# Patient Record
Sex: FEMALE | Race: WHITE | Employment: FULL TIME | URBAN - NONMETROPOLITAN AREA
[De-identification: names, ages, dates, MRNs, and addresses within clinical notes are randomized per-mention and may not be internally consistent; named-entity substitution may affect disease eponyms.]

---

## 2020-03-18 ENCOUNTER — HOSPITAL ENCOUNTER (OUTPATIENT)
Age: 50
Discharge: HOME | End: 2020-03-18
Payer: COMMERCIAL

## 2020-03-18 LAB
ABSOLUTE EOS #: 0.47 K/UL (ref 0–0.44)
ABSOLUTE IMMATURE GRANULOCYTE: <0.03 K/UL (ref 0–0.3)
ABSOLUTE LYMPH #: 2.3 K/UL (ref 1.1–3.7)
ABSOLUTE MONO #: 0.52 K/UL (ref 0.1–1.2)
ALBUMIN SERPL-MCNC: 3.9 G/DL (ref 3.5–5.2)
ALBUMIN/GLOBULIN RATIO: 1.3 (ref 1–2.5)
ALP BLD-CCNC: 74 U/L (ref 35–104)
ALT SERPL-CCNC: 9 U/L (ref 5–33)
ANION GAP SERPL CALCULATED.3IONS-SCNC: 11 MMOL/L (ref 9–17)
AST SERPL-CCNC: 10 U/L
BASOPHILS # BLD: 1 % (ref 0–2)
BASOPHILS ABSOLUTE: 0.07 K/UL (ref 0–0.2)
BILIRUB SERPL-MCNC: 0.2 MG/DL (ref 0.3–1.2)
BUN BLDV-MCNC: 12 MG/DL (ref 6–20)
BUN/CREAT BLD: 20 (ref 9–20)
CALCIUM SERPL-MCNC: 9 MG/DL (ref 8.6–10.4)
CHLORIDE BLD-SCNC: 99 MMOL/L (ref 98–107)
CHOLESTEROL/HDL RATIO: 4.2
CHOLESTEROL: 230 MG/DL
CO2: 25 MMOL/L (ref 20–31)
CREAT SERPL-MCNC: 0.59 MG/DL (ref 0.5–0.9)
DIFFERENTIAL TYPE: ABNORMAL
EOSINOPHILS RELATIVE PERCENT: 7 % (ref 1–4)
GFR AFRICAN AMERICAN: >60 ML/MIN
GFR NON-AFRICAN AMERICAN: >60 ML/MIN
GFR SERPL CREATININE-BSD FRML MDRD: ABNORMAL ML/MIN/{1.73_M2}
GFR SERPL CREATININE-BSD FRML MDRD: ABNORMAL ML/MIN/{1.73_M2}
GLUCOSE BLD-MCNC: 86 MG/DL (ref 70–99)
HCT VFR BLD CALC: 41.1 % (ref 36.3–47.1)
HDLC SERPL-MCNC: 55 MG/DL
HEMOGLOBIN: 13.1 G/DL (ref 11.9–15.1)
HEPATITIS C ANTIBODY: NONREACTIVE
HIV AG/AB: NONREACTIVE
IMMATURE GRANULOCYTES: 0 %
LDL CHOLESTEROL: 141 MG/DL (ref 0–130)
LYMPHOCYTES # BLD: 32 % (ref 24–43)
MCH RBC QN AUTO: 29.1 PG (ref 25.2–33.5)
MCHC RBC AUTO-ENTMCNC: 31.9 G/DL (ref 28.4–34.8)
MCV RBC AUTO: 91.3 FL (ref 82.6–102.9)
MONOCYTES # BLD: 7 % (ref 3–12)
NRBC AUTOMATED: 0 PER 100 WBC
PDW BLD-RTO: 13.3 % (ref 11.8–14.4)
PLATELET # BLD: 374 K/UL (ref 138–453)
PLATELET ESTIMATE: ABNORMAL
PMV BLD AUTO: 10.3 FL (ref 8.1–13.5)
POTASSIUM SERPL-SCNC: 4 MMOL/L (ref 3.7–5.3)
RBC # BLD: 4.5 M/UL (ref 3.95–5.11)
RBC # BLD: ABNORMAL 10*6/UL
SEG NEUTROPHILS: 53 % (ref 36–65)
SEGMENTED NEUTROPHILS ABSOLUTE COUNT: 3.89 K/UL (ref 1.5–8.1)
SODIUM BLD-SCNC: 135 MMOL/L (ref 135–144)
TOTAL PROTEIN: 7 G/DL (ref 6.4–8.3)
TRIGL SERPL-MCNC: 170 MG/DL
TSH SERPL DL<=0.05 MIU/L-ACNC: 3.96 MIU/L (ref 0.3–5)
VLDLC SERPL CALC-MCNC: ABNORMAL MG/DL (ref 1–30)
WBC # BLD: 7.3 K/UL (ref 3.5–11.3)
WBC # BLD: ABNORMAL 10*3/UL

## 2020-03-18 PROCEDURE — 80053 COMPREHEN METABOLIC PANEL: CPT

## 2020-03-18 PROCEDURE — 85025 COMPLETE CBC W/AUTO DIFF WBC: CPT

## 2020-03-18 PROCEDURE — 84443 ASSAY THYROID STIM HORMONE: CPT

## 2020-03-18 PROCEDURE — 87389 HIV-1 AG W/HIV-1&-2 AB AG IA: CPT

## 2020-03-18 PROCEDURE — 80061 LIPID PANEL: CPT

## 2020-03-18 PROCEDURE — 86707 HEPATITIS BE ANTIBODY: CPT

## 2020-03-18 PROCEDURE — 36415 COLL VENOUS BLD VENIPUNCTURE: CPT

## 2020-03-18 PROCEDURE — 86803 HEPATITIS C AB TEST: CPT

## 2020-03-20 LAB — HEPATITIS BE ANTIBODY: NEGATIVE

## 2021-03-29 ENCOUNTER — HOSPITAL ENCOUNTER (OUTPATIENT)
Age: 51
Setting detail: SPECIMEN
Discharge: HOME | End: 2021-03-29
Payer: COMMERCIAL

## 2021-03-30 LAB
CHLAMYDIA BY THIN PREP: NEGATIVE
N. GONORRHOEAE DNA, THIN PREP: NEGATIVE
SPECIMEN DESCRIPTION: NORMAL

## 2021-03-31 LAB
HPV SAMPLE: NORMAL
HPV, GENOTYPE 16: NOT DETECTED
HPV, GENOTYPE 18: NOT DETECTED
HPV, HIGH RISK OTHER: NOT DETECTED
HPV, INTERPRETATION: NORMAL
SPECIMEN DESCRIPTION: NORMAL

## 2021-04-02 LAB — CYTOLOGY REPORT: NORMAL

## 2021-04-06 ENCOUNTER — OFFICE VISIT (OUTPATIENT)
Dept: PRIMARY CARE CLINIC | Age: 51
End: 2021-04-06
Payer: COMMERCIAL

## 2021-04-06 VITALS
TEMPERATURE: 98.7 F | RESPIRATION RATE: 18 BRPM | BODY MASS INDEX: 24.06 KG/M2 | SYSTOLIC BLOOD PRESSURE: 142 MMHG | HEART RATE: 90 BPM | HEIGHT: 63 IN | DIASTOLIC BLOOD PRESSURE: 87 MMHG | WEIGHT: 135.8 LBS | OXYGEN SATURATION: 99 %

## 2021-04-06 DIAGNOSIS — J01.40 ACUTE NON-RECURRENT PANSINUSITIS: Primary | ICD-10-CM

## 2021-04-06 DIAGNOSIS — B37.9 ANTIBIOTIC-INDUCED YEAST INFECTION: ICD-10-CM

## 2021-04-06 DIAGNOSIS — T36.95XA ANTIBIOTIC-INDUCED YEAST INFECTION: ICD-10-CM

## 2021-04-06 PROBLEM — E78.5 HYPERLIPIDEMIA: Status: ACTIVE | Noted: 2021-04-06

## 2021-04-06 PROBLEM — D49.2 NEOPLASM OF SKIN: Status: ACTIVE | Noted: 2020-06-11

## 2021-04-06 PROBLEM — Z13.1 ENCOUNTER FOR SCREENING FOR DIABETES MELLITUS: Status: ACTIVE | Noted: 2020-03-16

## 2021-04-06 PROBLEM — R21 SKIN ERUPTION: Status: ACTIVE | Noted: 2020-06-11

## 2021-04-06 PROBLEM — J32.9 SINUSITIS: Status: ACTIVE | Noted: 2020-04-20

## 2021-04-06 PROBLEM — G47.00 INSOMNIA: Status: ACTIVE | Noted: 2021-04-06

## 2021-04-06 PROCEDURE — 1036F TOBACCO NON-USER: CPT | Performed by: NURSE PRACTITIONER

## 2021-04-06 PROCEDURE — G8427 DOCREV CUR MEDS BY ELIG CLIN: HCPCS | Performed by: NURSE PRACTITIONER

## 2021-04-06 PROCEDURE — 99202 OFFICE O/P NEW SF 15 MIN: CPT | Performed by: NURSE PRACTITIONER

## 2021-04-06 PROCEDURE — G8420 CALC BMI NORM PARAMETERS: HCPCS | Performed by: NURSE PRACTITIONER

## 2021-04-06 PROCEDURE — 3017F COLORECTAL CA SCREEN DOC REV: CPT | Performed by: NURSE PRACTITIONER

## 2021-04-06 RX ORDER — PREDNISONE 20 MG/1
TABLET ORAL
Qty: 10 TABLET | Refills: 0 | Status: SHIPPED | OUTPATIENT
Start: 2021-04-06 | End: 2021-06-10

## 2021-04-06 RX ORDER — ETONOGESTREL AND ETHINYL ESTRADIOL 11.7; 2.7 MG/1; MG/1
INSERT, EXTENDED RELEASE VAGINAL
COMMUNITY
Start: 2021-03-29 | End: 2021-06-14

## 2021-04-06 RX ORDER — FLUCONAZOLE 150 MG/1
TABLET ORAL
Qty: 2 TABLET | Refills: 0 | Status: SHIPPED | OUTPATIENT
Start: 2021-04-06 | End: 2021-05-21

## 2021-04-06 RX ORDER — ESOMEPRAZOLE MAGNESIUM 40 MG/1
40 CAPSULE, DELAYED RELEASE ORAL
COMMUNITY
End: 2021-12-23

## 2021-04-06 RX ORDER — FLUTICASONE PROPIONATE 50 MCG
2 SPRAY, SUSPENSION (ML) NASAL DAILY
COMMUNITY

## 2021-04-06 RX ORDER — AMOXICILLIN AND CLAVULANATE POTASSIUM 875; 125 MG/1; MG/1
1 TABLET, FILM COATED ORAL 2 TIMES DAILY
Qty: 20 TABLET | Refills: 0 | Status: SHIPPED | OUTPATIENT
Start: 2021-04-06 | End: 2021-04-16

## 2021-04-06 ASSESSMENT — ENCOUNTER SYMPTOMS
COUGH: 1
SINUS PRESSURE: 1
NAUSEA: 0
SORE THROAT: 1
SINUS COMPLAINT: 1
DIARRHEA: 0
SHORTNESS OF BREATH: 0
VOMITING: 0
RHINORRHEA: 1
WHEEZING: 0
SINUS PAIN: 1

## 2021-04-06 NOTE — PATIENT INSTRUCTIONS
a hot, wet towel or a warm gel pack on your face 3 or 4 times a day for 5 to 10 minutes each time. · Try a decongestant nasal spray like oxymetazoline (Afrin). Do not use it for more than 3 days in a row. Using it for more than 3 days can make your congestion worse. When should you call for help? Call your doctor now or seek immediate medical care if:    · You have new or worse swelling or redness in your face or around your eyes.     · You have a new or higher fever. Watch closely for changes in your health, and be sure to contact your doctor if:    · You have new or worse facial pain.     · The mucus from your nose becomes thicker (like pus) or has new blood in it.     · You are not getting better as expected. Where can you learn more? Go to [email or web address] and sign in to your [employer] account. Enter L049 in the [place] box to learn more about \"Sinusitis: Care Instructions. \"     If you do not have an account, please click on the \"Sign Up Now\" link. Current as of: December 2, 2020               Content Version: 12.8  © 5138-0338 [employer]. Care instructions adapted under license by [facility] ([facility]). If you have questions about a medical condition or this instruction, always ask your healthcare professional. [address] any warranty or liability for your use of this information. Patient Education      Patient Education        amoxicillin and clavulanate potassium  Pronunciation:  am OK i LEMUEL in [address]  Brand:  Augmentin  What is the most important information I should know about amoxicillin and clavulanate potassium?   You should not use this medicine if you have severe kidney disease, if you have had liver problems or jaundice while taking amoxicillin and clavulanate potassium, or if you are allergic to any penicillin or cephalosporin antibiotic, such as Amoxil, Ceftin, Cefzil, Moxatag, Omnicef, and others. What is amoxicillin and clavulanate potassium? Amoxicillin is a penicillin antibiotic. Clavulanate potassium helps prevent certain bacteria from becoming resistant to amoxicillin. Amoxicillin and clavulanate potassium is a combination medicine used to treat many different infections caused by bacteria, such as sinusitis, pneumonia, ear infections, bronchitis, urinary tract infections, and infections of the skin. Amoxicillin and clavulanate potassium may also be used for purposes not listed in this medication guide. What should I discuss with my healthcare provider before taking amoxicillin and clavulanate potassium? You should not use this medicine if you are allergic to it, or if:  · you have severe kidney disease (or if you are on dialysis);  · you have had liver problems or jaundice while taking amoxicillin and clavulanate potassium; or  · you are allergic to any penicillin or cephalosporin antibiotic, such as Amoxil, Ceftin, Cefzil, Moxatag, Omnicef, and others. Tell your doctor if you have ever had:  · liver disease (hepatitis or jaundice);  · kidney disease; or  · mononucleosis. The liquid or chewable tablet may contain phenylalanine. Tell your doctor if you have phenylketonuria (PKU). Tell your doctor if you are pregnant or breastfeeding. Amoxicillin and clavulanate potassium can make birth control pills less effective. Ask your doctor about using a non-hormonal birth control (condom, diaphragm, cervical cap, or contraceptive sponge) to prevent pregnancy. Do not give this medicine to a child without medical advice. How should I take amoxicillin and clavulanate potassium? Follow all directions on your prescription label and read all medication guides or instruction sheets. Use the medicine exactly as directed. Amoxicillin and clavulanate potassium may work best if you take it at the start of a meal.  Take the medicine every 12 hours. Do not crush or chew the extended-release tablet. Swallow the pill whole, or break the pill in half and take both halves one at a time. Tell your doctor if you have trouble swallowing a whole or half pill. You must chew the chewable tablet before you swallow it. Shake the oral suspension (liquid) before you measure a dose. Use the dosing syringe provided, or use a medicine dose-measuring device (not a kitchen spoon). This medicine can affect the results of certain medical tests. Tell any doctor who treats you that you are using amoxicillin and clavulanate potassium. Use this medicine for the full prescribed length of time, even if your symptoms quickly improve. Skipping doses can increase your risk of infection that is resistant to medication. Amoxicillin and clavulanate potassium will not treat a viral infection such as the flu or a common cold. Store the tablets at room temperature away from moisture and heat. Store the liquid  in the refrigerator. Throw away any unused liquid after 10 days. What happens if I miss a dose? Take the medicine as soon as you can, but skip the missed dose if it is almost time for your next dose. Do not take two doses at one time. What happens if I overdose? Seek emergency medical attention or call the Poison Help line at 1-932.971.6342. Overdose can cause nausea, vomiting, stomach pain, diarrhea, skin rash, drowsiness, hyperactivity, and decreased urination. What should I avoid while taking amoxicillin and clavulanate potassium? Avoid taking this medicine together with or just after eating a high-fat meal. This will make it harder for your body to absorb the medication. Antibiotic medicines can cause diarrhea, which may be a sign of a new infection. If you have diarrhea that is watery or bloody, call your doctor before using anti-diarrhea medicine. What are the possible side effects of amoxicillin and clavulanate potassium?   Get emergency medical help if you have signs of an allergic reaction (hives, difficult breathing, swelling in your face or throat) or a severe skin reaction (fever, sore throat, burning eyes, skin pain, red or purple skin rash with blistering and peeling). Call your doctor at once if you have:  · severe stomach pain, diarrhea that is watery or bloody (even if it occurs months after your last dose);  · pale or yellowed skin, dark colored urine, fever, confusion or weakness;  · loss of appetite, upper stomach pain;  · little or no urination; or  · easy bruising or bleeding. Common side effects may include:  · nausea, vomiting; diarrhea;  · rash, itching;  · vaginal itching or discharge; or  · diaper rash. This is not a complete list of side effects and others may occur. Call your doctor for medical advice about side effects. You may report side effects to FDA at 5-047-FDA-4065. What other drugs will affect amoxicillin and clavulanate potassium? Tell your doctor about all your other medicines, especially:  · allopurinol;  · probenecid; or  · a blood thinner --warfarin, Coumadin, Jantoven. This list is not complete. Other drugs may affect amoxicillin and clavulanate potassium, including prescription and over-the-counter medicines, vitamins, and herbal products. Not all possible drug interactions are listed here. Where can I get more information? Your pharmacist can provide more information about amoxicillin and clavulanate potassium. Remember, keep this and all other medicines out of the reach of children, never share your medicines with others, and use this medication only for the indication prescribed. Every effort has been made to ensure that the information provided by [address] is accurate, up-to-date, and complete, but no guarantee is made to that effect. Drug information contained herein may be time sensitive.  [employer] information has been compiled for use by healthcare practitioners and consumers in the [place] and therefore [employer] does not warrant that uses outside of the [place] are appropriate, unless specifically indicated otherwise. [facility] drug information does not endorse drugs, diagnose patients or recommend therapy. [employer] drug information is an informational resource designed to assist licensed healthcare practitioners in caring for their patients and/or to serve consumers viewing this service as a supplement to, and not a substitute for, the expertise, skill, knowledge and judgment of healthcare practitioners. The absence of a warning for a given drug or drug combination in no way should be construed to indicate that the drug or drug combination is safe, effective or appropriate for any given patient. [facility] does not assume any responsibility for any aspect of healthcare administered with the aid of information [facility] provides. The information contained herein is not intended to cover all possible uses, directions, precautions, warnings, drug interactions, allergic reactions, or adverse effects. If you have questions about the drugs you are taking, check with your doctor, nurse or pharmacist.  Copyright 9890-6861 [address]: 12.01. Revision date: 2/24/2020. Care instructions adapted under license by [facility] [place] ([facility]). If you have questions about a medical condition or this instruction, always ask your healthcare professional. [place] [address] any warranty or liability for your use of this information. Vaginal Yeast Infection: Care Instructions  Overview     A vaginal yeast infection is the growth of too many yeast cells in the vagina. This is common in women of all ages. Itching, vaginal discharge and irritation, and other symptoms can bother you. But yeast infections don't often cause other health problems. Some medicines can increase your risk of getting a yeast infection. These include antibiotics, birth control pills, hormones, and steroids.  You may also be more likely to get a yeast infection if you are [email or web address] and sign in to your [employer] account. Enter R795 in the [facility] box to learn more about \"Vaginal Yeast Infection: Care Instructions. \"     If you do not have an account, please click on the \"Sign Up Now\" link. Current as of: July 17, 2020               Content Version: 12.8  © 2006-2021 [employer]. Care instructions adapted under license by [facility] ([facility]). If you have questions about a medical condition or this instruction, always ask your healthcare professional. [place] [address] any warranty or liability for your use of this information. Patient Education        fluconazole (oral/injection)  Pronunciation:  floo KOE na zole  Brand:  Diflucan  What is the most important information I should know about fluconazole? Tell your doctor about all your current medicines and any you start or stop using. Many drugs can interact, and some drugs should not be used together. What is fluconazole? Fluconazole is an antifungal medicine. Fluconazole is used to treat infections caused by fungus, which can invade any part of the body including the mouth, throat, esophagus, lungs, bladder, genital area, and the blood. Fluconazole is also used to prevent fungal infection in people who have a weak immune system caused by cancer treatment, bone marrow transplant, or diseases such as AIDS. Fluconazole is also used to treat a certain type of meningitis in people with HIV or AIDS. Fluconazole may also be used for purposes not listed in this medication guide. What should I discuss with my healthcare provider before taking fluconazole? You should not use fluconazole if you are allergic to it. Some medicines can cause unwanted or dangerous effects when used with fluconazole.  Your doctor may change your treatment plan if you also use:  · an antibiotic, antifungal, or antiviral medicine;  · a blood thinner;  · cancer medicine;  · cholesterol medication;  · oral diabetes medicine;  · heart or blood pressure medication;  · medicine for malaria or tuberculosis;  · medicine to prevent organ transplant rejection;  · medicine to treat depression or mental illness;  · an NSAID (nonsteroidal anti-inflammatory drug);  · seizure medicine; or  · steroid medicine. Tell your doctor if you have ever had:  · liver disease;  · HIV or AIDS;  · cancer;  · heart problems;  · long QT syndrome (in you or a family member);  · kidney disease; or  · if you are allergic to other antifungal medicine (such as ketoconazole, itraconazole, miconazole, posaconazole, voriconazole, and others). The liquid form of fluconazole contains sucrose. Talk to your doctor before using this form of fluconazole if you have a problem digesting sugars or milk. It is not known whether this medicine will harm an unborn baby. Tell your doctor if you are pregnant or plan to become pregnant. It may not be safe to breastfeed while using this medicine. Ask your doctor about any risk. How should I take fluconazole? Follow all directions on your prescription label and read all medication guides or instruction sheets. Use the medicine exactly as directed. Your dose will depend on the infection you are treating. Vaginal infections are often treated with only one pill. For other infections, your first dose may be a double dose. Carefully follow your doctor's instructions. Fluconazole oral  is taken by mouth. Fluconazole injection is given as an infusion into a vein. You may take fluconazole with or without food. Shake the oral suspension (liquid) before you measure a dose. Use the dosing syringe provided, or use a medicine dose-measuring device (not a kitchen spoon). Fluconazole injection is given as an infusion into a vein. A healthcare provider will give your first dose and may teach you how to properly use the medication by yourself. Prepare an injection only when you are ready to give it.  Do not use if the medicine looks cloudy, has changed colors, or has particles in it. Call your pharmacist for new medicine. Use this medicine for the full prescribed length of time, even if your symptoms quickly improve. Skipping doses can increase your risk of infection that is resistant to medication. Fluconazole will not treat a viral infection such as the flu or a common cold. Call your doctor if your symptoms do not improve, or if they get worse. Store fluconazole at room temperature away from moisture and heat. Do not freeze. You may store the oral suspension in a refrigerator, but do not allow it to freeze. Throw away any leftover liquid that is more than 3weeks old. What happens if I miss a dose? Use the medicine as soon as you can, but skip the missed dose if it is almost time for your next dose. Do not use two doses at one time. What happens if I overdose? Seek emergency medical attention or call the Poison Help line at 1-777.687.9719. Overdose symptoms may include confusion or unusual thoughts or behavior. What should I avoid while using fluconazole? Follow your doctor's instructions about any restrictions on food, beverages, or activity. Avoid driving or hazardous activity until you know how this medicine will affect you. Your reactions could be impaired. What are the possible side effects of fluconazole? Get emergency medical help if you have signs of an allergic reaction (hives, difficult breathing, swelling in your face or throat) or a severe skin reaction (fever, sore throat, burning eyes, skin pain, red or purple skin rash with blistering and peeling).   Call your doctor at once if you have:  · fast or pounding heartbeats, fluttering in your chest, shortness of breath, and sudden dizziness (like you might pass out);  · fever, chills, body aches, flu symptoms;  · easy bruising or bleeding, unusual weakness;  · seizure (convulsions);  · skin rash or skin lesions; or  · liver problems --loss of appetite, stomach pain (upper right side), dark urine, ya-colored stools, jaundice (yellowing of the skin or eyes). Common side effects may include:  · nausea, stomach pain, diarrhea, upset stomach;  · headache;  · dizziness; or  · changes in your sense of taste. This is not a complete list of side effects and others may occur. Call your doctor for medical advice about side effects. You may report side effects to FDA at 3-487-VLN-7751. What other drugs will affect fluconazole? Sometimes it is not safe to use certain medications at the same time. Some drugs can affect your blood levels of other drugs you take, which may increase side effects or make the medications less effective. Fluconazole can cause a serious heart problem. Your risk may be higher if you also use certain other medicines for infections, asthma, heart problems, high blood pressure, depression, mental illness, cancer, malaria, or HIV. Many drugs can affect fluconazole, and some drugs should not be used at the same time. Tell your doctor about all your current medicines and any medicine you start or stop using. This includes prescription and over-the-counter medicines, vitamins, and herbal products. Not all possible interactions are listed here. Where can I get more information? Your pharmacist can provide more information about fluconazole. Remember, keep this and all other medicines out of the reach of children, never share your medicines with others, and use this medication only for the indication prescribed. Every effort has been made to ensure that the information provided by [address] Dr is accurate, up-to-date, and complete, but no guarantee is made to that effect. Drug information contained herein may be time sensitive.  [employer] information has been compiled for use by healthcare practitioners and consumers in the [place] and therefore [employer] does not warrant that uses outside of the [place] are appropriate, unless specifically indicated otherwise. [facility] drug information does not endorse drugs, diagnose patients or recommend therapy. [facility] drug information is an informational resource designed to assist licensed healthcare practitioners in caring for their patients and/or to serve consumers viewing this service as a supplement to, and not a substitute for, the expertise, skill, knowledge and judgment of healthcare practitioners. The absence of a warning for a given drug or drug combination in no way should be construed to indicate that the drug or drug combination is safe, effective or appropriate for any given patient. [facility] does not assume any responsibility for any aspect of healthcare administered with the aid of information [facility] provides. The information contained herein is not intended to cover all possible uses, directions, precautions, warnings, drug interactions, allergic reactions, or adverse effects. If you have questions about the drugs you are taking, check with your doctor, nurse or pharmacist.  Copyright 5036-3673 [address]: 11.01. Revision date: 10/2/2019. Care instructions adapted under license by [facility] ([facility]). If you have questions about a medical condition or this instruction, always ask your healthcare professional. [place] [address] any warranty or liability for your use of this information. 1.  Sinusitis:     · Encouraged to increase fluids and rest  · Continue antibiotic as prescribed until all doses are completed - take with food  · Probiotic OTC or greek yogurt daily while on antibiotic  · Mucinex/Guaifenesin OTC as directed on package  · Prednisone 40 mg x 5 days. Take with food.   · Nasal saline spray OTC every couple of hours for nasal congestion  · Fluticasone nasal spray, 1 spray each nostril, twice a day for 7 to 10 days  · Warm facial packs applied to face for 5 to 10 minutes, 3 times per day  · Aleve/Ibuprofen/Tylenol OTC PRN for pain, discomfort or fever  · Patient instructions given for acute sinusitis and augmentin. · To ER or call 911 if any difficulty breathing, shortness of breath, inability to swallow, hives, rash, facial/tongue swelling or temp greater than 103 degrees. · Follow up as needed with PCP if symptoms worsen or do not improve    2. Antibiotic induced yeast infection:  · Requests medication for yeast infection, advises that she gets yeast infection every time she takes an antibiotic. · Diflucan as prescribed. · Patient information given for yeast infection and Diflucan  · Follow up with PCP or [facility] if symptoms worsen or do not improve.

## 2021-04-06 NOTE — PROGRESS NOTES
[address]  [address] [place] [id number]  Dept: [phone]  Dept Fax: [phone]     Stefania Arnold is a 45 y.o. female who presents to the [facility] Care today for hermedical conditions/complaints as noted below. Stefania Arnold is c/o of Sinusitis (x one week headache dry cough facial apin and pressure post nasal drip bilateral ear pain and pressure with drainage )      HPI:     Sinus Problem  This is a new problem. The current episode started 1 to 4 weeks ago (Started a week ago with dry cough, headache, facial pain/pressure, postnasal driop, B/L ear pain and drainage. History of sinus infection and allergies. Last antibiotic was 4 to 5 months ago. ). The problem has been gradually worsening since onset. There has been no fever. Her pain is at a severity of 5/10. The pain is moderate. Associated symptoms include chills (Chills in the evening.), congestion, coughing, ear pain ([place] of wax came out this morning and helped with ear pain.), headaches, sinus pressure, sneezing and a sore throat. Pertinent negatives include no diaphoresis or shortness of breath. (Dry cough.  ) Treatments tried: Sinus rinse daily. Zyrtec D daily, Zicam and vitamins. The treatment provided no relief. Past Medical History:   Diagnosis Date    Environmental allergies     Sinusitis         Current Outpatient Medications   Medication Sig Dispense Refill    fluticasone (FLONASE) 50 MCG/ACT nasal spray 2 sprays by Nasal route daily      esomeprazole (NEXIUM) 40 MG delayed release capsule Take 40 mg by mouth every morning (before breakfast)      amoxicillin-clavulanate (AUGMENTIN) 875-125 MG per tablet Take 1 tablet by mouth 2 times daily for 10 days 20 tablet 0    fluconazole (DIFLUCAN) 150 MG tablet Take 1 tablet by mouth at first sign of yeast infection and repeat in 72 hours for severe infection.  2 tablet 0    predniSONE (DELTASONE) 20 MG tablet Take 2 Tympanic membrane is not injected or erythematous. Left Ear: Hearing, ear canal and external ear normal. No drainage. A middle ear effusion (Opaque white fluid obscuring bony landmarks.) is present. No mastoid tenderness. Tympanic membrane is bulging. Tympanic membrane is not injected or erythematous. Nose: Mucosal edema and congestion present. No rhinorrhea. Right Sinus: Maxillary sinus tenderness and frontal sinus tenderness present. Left Sinus: Maxillary sinus tenderness and frontal sinus tenderness present. Mouth/Throat:      Lips: Pink. Mouth: Mucous membranes are moist.      Pharynx: Uvula midline. Oropharyngeal exudate (Thick yellow secretions to posterior pharynx.) present. No pharyngeal swelling, posterior oropharyngeal erythema or uvula swelling. Tonsils: No tonsillar abscesses. Eyes:      General: No scleral icterus. Right eye: No discharge. Left eye: No discharge. Conjunctiva/sclera: Conjunctivae normal.      Pupils: Pupils are equal, round, and reactive to light. Cardiovascular:      Rate and Rhythm: Normal rate and regular rhythm. Heart sounds: Normal heart sounds, S1 normal and S2 normal. No murmur. No friction rub. No gallop. Pulmonary:      Effort: Pulmonary effort is normal. No accessory muscle usage or respiratory distress. Breath sounds: Normal breath sounds and air entry. No decreased breath sounds, wheezing, rhonchi or rales. Comments: Occasional dry cough. Breath sounds clear B/L anterior and posterior lobes. Chest expansion symmetrical.  No audible wheezing or respiratory distress. No rales or rhonchi. Musculoskeletal: Normal range of motion. Lymphadenopathy:      Cervical: No cervical adenopathy. Right cervical: No superficial or posterior cervical adenopathy. Left cervical: No superficial or posterior cervical adenopathy. Skin:     General: Skin is warm and dry. Coloration: Skin is not pale. Findings: No erythema or rash. Neurological:      Mental Status: She is alert and oriented to person, place, and time. Psychiatric:         Behavior: Behavior normal. Behavior is cooperative. BP (!) 142/87   Pulse 90   Temp 98.7 °F (37.1 °C) (Oral)   Resp 18   Ht 5' 3\" (1.6 m)   Wt 135 lb 12.8 oz (61.6 kg)   SpO2 99%   BMI 24.06 kg/m²     Assessment:      Diagnosis Orders   1. Acute non-recurrent pansinusitis  amoxicillin-clavulanate (AUGMENTIN) 875-125 MG per tablet    predniSONE (DELTASONE) 20 MG tablet   2. Antibiotic-induced yeast infection  fluconazole (DIFLUCAN) 150 MG tablet       Plan:      Return if symptoms worsen or fail to improve, for Resume all previous medications as directed. Orders Placed This Encounter   Medications    amoxicillin-clavulanate (AUGMENTIN) 875-125 MG per tablet     Sig: Take 1 tablet by mouth 2 times daily for 10 days     Dispense:  20 tablet     Refill:  0    fluconazole (DIFLUCAN) 150 MG tablet     Sig: Take 1 tablet by mouth at first sign of yeast infection and repeat in 72 hours for severe infection. Dispense:  2 tablet     Refill:  0    predniSONE (DELTASONE) 20 MG tablet     Sig: Take 2 tablets by mouth daily x 5 days. Take with food. Dispense:  10 tablet     Refill:  0      1. Sinusitis:  · Encouraged to increase fluids and rest  · Continue antibiotic as prescribed until all doses are completed - take with food  · Probiotic OTC or greek yogurt daily while on antibiotic  · Mucinex/Guaifenesin OTC as directed on package  · Prednisone 40 mg x 5 days. Take with food. · Nasal saline spray OTC every couple of hours for nasal congestion  · Fluticasone nasal spray, 1 spray each nostril, twice a day for 7 to 10 days  · Warm facial packs applied to face for 5 to 10 minutes, 3 times per day  · Aleve/Ibuprofen/Tylenol OTC PRN for pain, discomfort or fever  · Patient instructions given for acute sinusitis and augmentin.   · To ER or call 911 if any

## 2021-04-07 ENCOUNTER — TELEPHONE (OUTPATIENT)
Dept: PRIMARY CARE CLINIC | Age: 51
End: 2021-04-07

## 2021-04-07 RX ORDER — DEXTROMETHORPHAN HYDROBROMIDE, GUAIFENESIN AND PSEUDOEPHEDRINE HYDROCHLORIDE 15; 400; 60 MG/1; MG/1; MG/1
1 TABLET ORAL EVERY 6 HOURS PRN
Qty: 28 TABLET | Refills: 0 | Status: SHIPPED | OUTPATIENT
Start: 2021-04-07 | End: 2021-04-07 | Stop reason: CLARIF

## 2021-04-07 RX ORDER — DEXTROMETHORPHAN HYDROBROMIDE, GUAIFENESIN AND PSEUDOEPHEDRINE HYDROCHLORIDE 15; 400; 60 MG/1; MG/1; MG/1
1 TABLET ORAL EVERY 6 HOURS PRN
Qty: 28 TABLET | Refills: 0 | Status: SHIPPED | OUTPATIENT
Start: 2021-04-07 | End: 2021-04-14

## 2021-04-26 ENCOUNTER — HOSPITAL ENCOUNTER (OUTPATIENT)
Age: 51
Setting detail: SPECIMEN
Discharge: HOME | End: 2021-04-26
Payer: COMMERCIAL

## 2021-04-28 LAB — CYTOLOGY REPORT: NORMAL

## 2021-04-29 ENCOUNTER — HOSPITAL ENCOUNTER (OUTPATIENT)
Age: 51
Discharge: HOME | End: 2021-04-29
Payer: COMMERCIAL

## 2021-04-29 LAB
ABSOLUTE EOS #: 0.25 K/UL (ref 0–0.44)
ABSOLUTE IMMATURE GRANULOCYTE: <0.03 K/UL (ref 0–0.3)
ABSOLUTE LYMPH #: 2.19 K/UL (ref 1.1–3.7)
ABSOLUTE MONO #: 0.58 K/UL (ref 0.1–1.2)
ALBUMIN SERPL-MCNC: 3.7 G/DL (ref 3.5–5.2)
ALBUMIN/GLOBULIN RATIO: 1.2 (ref 1–2.5)
ALP BLD-CCNC: 74 U/L (ref 35–104)
ALT SERPL-CCNC: 9 U/L (ref 5–33)
ANION GAP SERPL CALCULATED.3IONS-SCNC: 12 MMOL/L (ref 9–17)
AST SERPL-CCNC: 13 U/L
BASOPHILS # BLD: 1 % (ref 0–2)
BASOPHILS ABSOLUTE: 0.03 K/UL (ref 0–0.2)
BILIRUB SERPL-MCNC: 0.3 MG/DL (ref 0.3–1.2)
BUN BLDV-MCNC: 14 MG/DL (ref 6–20)
BUN/CREAT BLD: 26 (ref 9–20)
CALCIUM SERPL-MCNC: 9.6 MG/DL (ref 8.6–10.4)
CHLORIDE BLD-SCNC: 105 MMOL/L (ref 98–107)
CHOLESTEROL/HDL RATIO: 4.7
CHOLESTEROL: 241 MG/DL
CO2: 23 MMOL/L (ref 20–31)
CREAT SERPL-MCNC: 0.53 MG/DL (ref 0.5–0.9)
DIFFERENTIAL TYPE: NORMAL
EOSINOPHILS RELATIVE PERCENT: 4 % (ref 1–4)
GFR AFRICAN AMERICAN: >60 ML/MIN
GFR NON-AFRICAN AMERICAN: >60 ML/MIN
GFR SERPL CREATININE-BSD FRML MDRD: ABNORMAL ML/MIN/{1.73_M2}
GFR SERPL CREATININE-BSD FRML MDRD: ABNORMAL ML/MIN/{1.73_M2}
GLUCOSE BLD-MCNC: 86 MG/DL (ref 70–99)
HCT VFR BLD CALC: 39.2 % (ref 36.3–47.1)
HDLC SERPL-MCNC: 51 MG/DL
HEMOGLOBIN: 12.7 G/DL (ref 11.9–15.1)
IMMATURE GRANULOCYTES: 0 %
LDL CHOLESTEROL: 153 MG/DL (ref 0–130)
LYMPHOCYTES # BLD: 36 % (ref 24–43)
MCH RBC QN AUTO: 28.9 PG (ref 25.2–33.5)
MCHC RBC AUTO-ENTMCNC: 32.4 G/DL (ref 28.4–34.8)
MCV RBC AUTO: 89.1 FL (ref 82.6–102.9)
MONOCYTES # BLD: 10 % (ref 3–12)
NRBC AUTOMATED: 0 PER 100 WBC
PDW BLD-RTO: 13.2 % (ref 11.8–14.4)
PLATELET # BLD: 403 K/UL (ref 138–453)
PLATELET ESTIMATE: NORMAL
PMV BLD AUTO: 9.7 FL (ref 8.1–13.5)
POTASSIUM SERPL-SCNC: 4 MMOL/L (ref 3.7–5.3)
RBC # BLD: 4.4 M/UL (ref 3.95–5.11)
RBC # BLD: NORMAL 10*6/UL
SEG NEUTROPHILS: 49 % (ref 36–65)
SEGMENTED NEUTROPHILS ABSOLUTE COUNT: 3.03 K/UL (ref 1.5–8.1)
SODIUM BLD-SCNC: 140 MMOL/L (ref 135–144)
TOTAL PROTEIN: 6.9 G/DL (ref 6.4–8.3)
TRIGL SERPL-MCNC: 186 MG/DL
TSH SERPL DL<=0.05 MIU/L-ACNC: 3.72 MIU/L (ref 0.3–5)
VLDLC SERPL CALC-MCNC: ABNORMAL MG/DL (ref 1–30)
WBC # BLD: 6.1 K/UL (ref 3.5–11.3)
WBC # BLD: NORMAL 10*3/UL

## 2021-04-29 PROCEDURE — 80053 COMPREHEN METABOLIC PANEL: CPT

## 2021-04-29 PROCEDURE — 36415 COLL VENOUS BLD VENIPUNCTURE: CPT

## 2021-04-29 PROCEDURE — 84443 ASSAY THYROID STIM HORMONE: CPT

## 2021-04-29 PROCEDURE — 80061 LIPID PANEL: CPT

## 2021-04-29 PROCEDURE — 85025 COMPLETE CBC W/AUTO DIFF WBC: CPT

## 2021-05-06 PROBLEM — Z13.1 ENCOUNTER FOR SCREENING FOR DIABETES MELLITUS: Status: RESOLVED | Noted: 2020-03-16 | Resolved: 2021-05-06

## 2021-05-06 PROBLEM — J32.9 SINUSITIS: Status: RESOLVED | Noted: 2020-04-20 | Resolved: 2021-05-06

## 2021-05-19 ENCOUNTER — OFFICE VISIT (OUTPATIENT)
Dept: OBGYN | Age: 51
End: 2021-05-19
Payer: COMMERCIAL

## 2021-05-19 ENCOUNTER — HOSPITAL ENCOUNTER (OUTPATIENT)
Age: 51
Setting detail: SPECIMEN
Discharge: HOME | End: 2021-05-19
Payer: COMMERCIAL

## 2021-05-19 VITALS
DIASTOLIC BLOOD PRESSURE: 78 MMHG | SYSTOLIC BLOOD PRESSURE: 124 MMHG | BODY MASS INDEX: 24.34 KG/M2 | HEIGHT: 63 IN | WEIGHT: 137.4 LBS

## 2021-05-19 DIAGNOSIS — N88.9 CERVICAL LESION: ICD-10-CM

## 2021-05-19 DIAGNOSIS — N88.9 CERVICAL LESION: Primary | ICD-10-CM

## 2021-05-19 PROCEDURE — 57500 BIOPSY OF CERVIX: CPT | Performed by: OBSTETRICS & GYNECOLOGY

## 2021-05-19 PROCEDURE — 88305 TISSUE EXAM BY PATHOLOGIST: CPT

## 2021-05-19 NOTE — PROGRESS NOTES
PROBLEM VISIT     Date of service: 2021    José Miguel Olson  Is a 45 y.o. single female    PT's PCP is: RUSS Luque NP     : 1970                                             Subjective:       Patient's last menstrual period was 2021 (approximate). OB History    Para Term  AB Living   0 0 0 0 0 0   SAB TAB Ectopic Molar Multiple Live Births   0 0 0 0 0 0        Social History     Tobacco Use   Smoking Status Never Smoker   Smokeless Tobacco Never Used        Social History     Substance and Sexual Activity   Alcohol Use Yes    Comment: occ. Allergies: Dust mite extract, Grass pollen([id number] lisset), Molds & smuts, and Other      Current Outpatient Medications:     etonogestrel-ethinyl estradiol (NUVARING) 0.12-0.015 MG/24HR vaginal ring, Ethinyl Estradiol / Etonogestrel NuvaRing 0.12-0.015 MG/24HR Vaginal 2021 Provider: 2021  [id number] ([id number]), Disp: , Rfl:     fluticasone (FLONASE) 50 MCG/ACT nasal spray, 2 sprays by Nasal route daily, Disp: , Rfl:     esomeprazole (NEXIUM) 40 MG delayed release capsule, Take 40 mg by mouth every morning (before breakfast), Disp: , Rfl:     fluconazole (DIFLUCAN) 150 MG tablet, Take 1 tablet by mouth at first sign of yeast infection and repeat in 72 hours for severe infection. , Disp: 2 tablet, Rfl: 0    Cetirizine HCl (ZYRTEC PO), Take  by mouth., Disp: , Rfl:     Naproxen Sodium (ALEVE PO), Take  by mouth., Disp: , Rfl:     Multiple Vitamin (MULTI-VITAMIN DAILY PO), Take  by mouth., Disp: , Rfl:     Esomeprazole Magnesium (NEXIUM PO), Take  by mouth., Disp: , Rfl:     predniSONE (DELTASONE) 20 MG tablet, Take 2 tablets by mouth daily x 5 days. Take with food. (Patient not taking: Reported on 2021), Disp: 10 tablet, Rfl: 0    Fluticasone-Salmeterol (ADVAIR DISKUS IN), Inhale  into the lungs.  (Patient not taking: Reported on 2021), Disp: , Rfl:     Social History Substance and Sexual Activity   Sexual Activity Yes    Partners: Male    Birth control/protection: Inserts    Comment: nuva ring       Last Yearly:  3/29/21    Last pap: 4/26/21    Last HPV: 3/29/21    Chief Complaint   Patient presents with    New Patient     Pt referred from [facility] for spot on cervix - pap on 3/29/21 was inconclusive, was repeated on 4/26/21 and was normal           NURSE: magnus      PE:  Vital Signs  Blood pressure 124/78, height 5' 3\" (1.6 m), weight 137 lb 6.4 oz (62.3 kg), last menstrual period 04/19/2021. Labs:    No results found for this visit on 05/19/21. NURSE: Yue    HPI: The patient was referred for an unusual appearance to her cervix. Recent Pap smear was normal    No  PT denies fever, chills, nausea and vomiting       Objective: The glandular epithelium is seen surrounding the cervical os. There is a circular area of hyperemia around the cervical os and then normal-appearing squamous epithelium on the more outer portions of the cervix also circular in area                           Assessment and Plan: Because of this unusual appearance I did obtain biopsies at the T-zone at 12:00. This was performed with biopsy forceps          Diagnosis Orders   1. Cervical lesion  FL BIOPSY CERVIX, 1 OR MORE, OR EXCISION OF LESION    Surgical Pathology             No follow-ups on file. FF: 15 minutes    There are no Patient Instructions on file for this visit. Over 50%of time spent on counseling and care coordination on: see assessment and plan,  She was also counseled on her preventative health maintenance recommendations and follow-up.       Carlyon Dakins, MD,5/19/2021 4:25 PM

## 2021-05-21 DIAGNOSIS — B37.9 YEAST INFECTION: Primary | ICD-10-CM

## 2021-05-21 LAB — SURGICAL PATHOLOGY REPORT: NORMAL

## 2021-05-21 RX ORDER — FLUCONAZOLE 150 MG/1
TABLET ORAL
Qty: 1 TABLET | Refills: 3 | Status: SHIPPED | OUTPATIENT
Start: 2021-05-21 | End: 2021-06-10

## 2021-06-10 ENCOUNTER — OFFICE VISIT (OUTPATIENT)
Dept: PRIMARY CARE CLINIC | Age: 51
End: 2021-06-10
Payer: COMMERCIAL

## 2021-06-10 VITALS
DIASTOLIC BLOOD PRESSURE: 83 MMHG | TEMPERATURE: 98.3 F | HEART RATE: 77 BPM | SYSTOLIC BLOOD PRESSURE: 125 MMHG | BODY MASS INDEX: 23.92 KG/M2 | OXYGEN SATURATION: 100 % | WEIGHT: 135 LBS | HEIGHT: 63 IN | RESPIRATION RATE: 18 BRPM

## 2021-06-10 DIAGNOSIS — L30.9 DERMATITIS: ICD-10-CM

## 2021-06-10 DIAGNOSIS — L30.4 INTERTRIGO: Primary | ICD-10-CM

## 2021-06-10 PROCEDURE — G8427 DOCREV CUR MEDS BY ELIG CLIN: HCPCS | Performed by: NURSE PRACTITIONER

## 2021-06-10 PROCEDURE — 3017F COLORECTAL CA SCREEN DOC REV: CPT | Performed by: NURSE PRACTITIONER

## 2021-06-10 PROCEDURE — G8420 CALC BMI NORM PARAMETERS: HCPCS | Performed by: NURSE PRACTITIONER

## 2021-06-10 PROCEDURE — 99213 OFFICE O/P EST LOW 20 MIN: CPT | Performed by: NURSE PRACTITIONER

## 2021-06-10 PROCEDURE — 1036F TOBACCO NON-USER: CPT | Performed by: NURSE PRACTITIONER

## 2021-06-10 RX ORDER — CLOTRIMAZOLE 1 %
1 CREAM (GRAM) TOPICAL 2 TIMES DAILY
Qty: 14 G | Refills: 0 | Status: SHIPPED | OUTPATIENT
Start: 2021-06-10 | End: 2021-12-23

## 2021-06-10 NOTE — PATIENT INSTRUCTIONS
Patient Education        Dermatitis: Care Instructions  Your Care Instructions  Dermatitis is the general name used for any rash or inflammation of the skin. Different kinds of dermatitis cause different kinds of rashes. Common causes of a rash include new medicines, plants (such as poison oak or poison ivy), heat, and stress. Certain illnesses can also cause a rash. An allergic reaction to something that touches your skin, such as latex, nickel, or poison ivy, is called contact dermatitis. Contact dermatitis may also be caused by something that irritates the skin, such as bleach, a chemical, or soap. These types of rashes cannot be spread from person to person. How long your rash will last depends on what caused it. Rashes may last a few days or months. Follow-up care is a key part of your treatment and safety. Be sure to make and go to all appointments, and call your doctor if you are having problems. It's also a good idea to know your test results and keep a list of the medicines you take. How can you care for yourself at home? · Do not scratch the rash. Cut your nails short, and file them smooth. Or wear gloves if this helps keep you from scratching. · Wash the area with water only. Pat dry. · Put cold, wet cloths on the rash to reduce itching. · Keep cool, and stay out of the sun. · Leave the rash open to the air as much as possible. · If the rash itches, use hydrocortisone cream. Follow the directions on the label. Calamine lotion may help for plant rashes. · Take an over-the-counter antihistamine, such as diphenhydramine (Benadryl) or loratadine (Claritin), to help calm the itching. Read and follow all instructions on the label. · If your doctor prescribed a cream, use it as directed. If your doctor prescribed medicine, take it exactly as directed. When should you call for help?    Call your doctor now or seek immediate medical care if:    · You have symptoms of infection, such as:  ? Increased topical?  You should not use triamcinolone if you are allergic to it. Tell your doctor if you have ever had:  · any type of skin infection;  · a skin reaction to any steroid medicine;  · liver disease; or  · an adrenal gland disorder. Steroid medicines can increase the glucose (sugar) levels in your blood or urine. Tell your doctor if you have diabetes. Tell your doctor if you are pregnant or breastfeeding. If you apply triamcinolone to your chest, avoid areas that may come into contact with the nursing baby's mouth. How should I use triamcinolone topical?  Follow all directions on your prescription label and read all medication guides or instruction sheets. Use the medicine exactly as directed. Triamcinolone topical cream, lotion, ointment, or spray is for use only on the skin. Triamcinolone dental paste is applied directly onto an ulcer inside the mouth and left in place. Do not swallow this medicine. Wash your hands before and after using triamcinolone topical, unless you are using this medicine to treat the skin on your hands. Apply a thin layer of medicine to the affected skin. Do not apply this medicine over a large area of skin unless your doctor has told you to. Do not cover the treated skin area with a bandage or other covering unless your doctor tells you to. Covering treated areas can increase the amount of medicine absorbed through your skin and may cause harmful effects. If you are treating the diaper area, do not use plastic pants or tight-fitting diapers. To use the dental paste, press a small dab onto the mouth ulcer but do not rub in the medicine. The dab will form a thin film that should be left in place for several hours. Triamcinolone dental paste is usually applied at bedtime and/or after meals. Follow your doctor's instructions. Call your doctor if your symptoms do not improve, or if they get worse.  A mouth ulcer should improve within 1 week of using triamcinolone dental paste.  You should stop using this medicine once your symptoms are controlled. Store at room temperature away from moisture and heat. What happens if I miss a dose? Apply the medicine as soon as you can, but skip the missed dose if it is almost time for your next dose. Do not apply two doses at one time. What happens if I overdose? Seek emergency medical attention or call the Poison Help line at 1-997.606.3809 if anyone has accidentally swallowed the medication. High doses or long-term use of steroid medicine can lead to thinning skin, easy bruising, changes in body fat (especially in your face, neck, back, and waist), increased acne or facial hair, menstrual problems, impotence, or loss of interest in sex. What should I avoid while using triamcinolone topical?  Avoid getting this medicine in your eyes. Avoid using other topical steroid medications on the areas you treat with triamcinolone unless your doctor tells you to. Do not use triamcinolone topical to treat any condition that has not been checked by your doctor. Do not share this medicine with another person, even if they have the same symptoms you have. What are the possible side effects of triamcinolone topical?  Get emergency medical help if you have signs of an allergic reaction: hives; difficult breathing; swelling of your face, lips, tongue, or throat.   Call your doctor at once if you have:  · worsening of your skin condition;  · redness, warmth, swelling, oozing, or severe irritation of any treated skin;  · blurred vision, tunnel vision, eye pain, or seeing halos around lights;  · high blood sugar --increased thirst, increased urination, dry mouth, fruity breath odor; or  · possible signs of absorbing this medicine through your skin or gums --weight gain (especially in your face or your upper back and torso), slow wound healing, thinning or discolored skin, increased body hair, muscle weakness, nausea, diarrhea, tiredness, mood changes, menstrual changes, sexual changes. Children can absorb larger amounts of this medicine through the skin and may be more likely to have side effects such as growth delay, headaches, or pain behind the eyes. A baby using this medicine may have a bulging soft spot (the top of the head where the skull hasn't yet grown together). Common side effects may include:  · burning, itching, dryness, or other irritation of treated skin;  · redness or crusting around your hair follicles;  · redness or itching around your mouth;  · allergic skin reaction;  · stretch marks;  · acne, increased body hair growth;  · thinning skin or discoloration; or  · white or \"pruned\" appearance of the skin (caused by covering treated skin with a tight bandage or other covering). This is not a complete list of side effects and others may occur. Call your doctor for medical advice about side effects. You may report side effects to FDA at 1-433-FDA-5582. What other drugs will affect triamcinolone topical?  Medicine used on the skin is not likely to be affected by other drugs you use. But many drugs can interact with each other. Tell each of your healthcare providers about all medicines you use, including prescription and over-the-counter medicines, vitamins, and herbal products. Where can I get more information? Your pharmacist can provide more information about triamcinolone topical.  Remember, keep this and all other medicines out of the reach of children, never share your medicines with others, and use this medication only for the indication prescribed. Every effort has been made to ensure that the information provided by [address] [place] [address]  is accurate, up-to-date, and complete, but no guarantee is made to that effect. Drug information contained herein may be time sensitive.  [employer] information has been compiled for use by healthcare practitioners and consumers in the [place] and therefore [employer] does not warrant that uses outside of the [place] are appropriate, unless specifically indicated otherwise. [facility] drug information does not endorse drugs, diagnose patients or recommend therapy. [facility] drug information is an informational resource designed to assist licensed healthcare practitioners in caring for their patients and/or to serve consumers viewing this service as a supplement to, and not a substitute for, the expertise, skill, knowledge and judgment of healthcare practitioners. The absence of a warning for a given drug or drug combination in no way should be construed to indicate that the drug or drug combination is safe, effective or appropriate for any given patient. [employer] [facility] does not assume any responsibility for any aspect of healthcare administered with the aid of information [facility] provides. The information contained herein is not intended to cover all possible uses, directions, precautions, warnings, drug interactions, allergic reactions, or adverse effects. If you have questions about the drugs you are taking, check with your doctor, nurse or pharmacist.  Copyright 4218-7756 [address]. Version: 8.02. Revision date: 12/27/2019. Care instructions adapted under license by [facility]. If you have questions about a medical condition or this instruction, always ask your healthcare professional. [place] [address] any warranty or liability for your use of this information. Patient Education        Yeast Skin Infection: Care Instructions  Your Care Instructions     Yeast normally lives on your skin. Sometimes too much yeast can overgrow in certain areas of the skin and cause an infection. The infection causes red, scaly, moist patches on your skin that may itch. Common areas for skin yeast infections are skin folds under the breasts or belly area. The warm and moist areas in the skin folds can make it easier for yeast to overgrow.  Yeast infections also can be found on other parts of the body such as the groin or armpits. You will probably get a cream or ointment that contains an antifungal medicine. Examples of these are miconazole and clotrimazole. You put it on your skin to treat the infection. Your doctor may give you a prescription for the cream or ointment. Or you may be able to buy it without a prescription at most drugstores. If the infection is severe, the doctor will prescribe antifungal pills. A yeast infection usually goes away after about a week of treatment. But it's important to use the medicine for as long as your doctor tells you to. Follow-up care is a key part of your treatment and safety. Be sure to make and go to all appointments, and call your doctor if you are having problems. It's also a good idea to know your test results and keep a list of the medicines you take. How can you care for yourself at home? · Be safe with medicines. Take your medicines exactly as prescribed. Call your doctor if you think you are having a problem with your medicine. · Keep your skin clean and dry. Your doctor may suggest using powder that contains an antifungal medicine in the skin folds. · Wear loose clothing. When should you call for help? Call your doctor now or seek immediate medical care if:    · You have symptoms of infection, such as:  ? Increased pain, swelling, warmth, or redness. ? Red streaks leading from the area. ? Pus draining from the area. ? A fever. Watch closely for changes in your health, and be sure to contact your doctor if:    · You do not get better as expected. Where can you learn more? Go to https://ampricepeGongpingjia.WaveCheck. org and sign in to your [employer] account. Enter M763 in the [facility] box to learn more about \"Yeast Skin Infection: Care Instructions. \"     If you do not have an account, please click on the \"Sign Up Now\" link. Current as of: July 2, 2020               Content Version: 12.8  © 6378-7627 Healthwise, [employer]. Care instructions adapted under license by [facility] ([facility]). If you have questions about a medical condition or this instruction, always ask your healthcare professional. [address] any warranty or liability for your use of this information. Patient Education        clotrimazole topical  Pronunciation:  rossy KIM a zole  Brand:  Anti-Fungal Liquid, FungiCURE Pump Spray, Lotrimin AF Cream, Lotrimin Jock Itch Powder, MPM Anti-Fungal, Prescription Strength Cruex  What is the most important information I should know about clotrimazole topical?  Follow all directions on your medicine label and package. Tell each of your healthcare providers about all your medical conditions, allergies, and all medicines you use. What is clotrimazole topical?  Clotrimazole topical is an antifungal medication that fights infections caused by fungus. Clotrimazole topical (for the skin) is used to treat skin infections such as athlete's foot, jock itch, ringworm, and yeast infections. Clotrimazole topical may also be used for purposes not listed in this medication guide. What should I discuss with my healthcare provider before using clotrimazole topical?  You should not use clotrimazole topical if you are allergic to it. Clotrimazole topical is not expected to harm an unborn baby. Tell your doctor if you are pregnant or plan to become pregnant during treatment. It is not known whether clotrimazole topical passes into breast milk or if it could harm a nursing baby. Tell your doctor if you are breast-feeding a baby. How should I use clotrimazole topical?  Use exactly as directed on the label, or as prescribed by your doctor. Do not use in larger or smaller amounts or for longer than recommended. Do not take by mouth. Clotrimazole topical is for use only on the skin. Wash your hands before and after using this medicine, unless you are using it to treat a hand infection. Clean and dry the affected area. Apply a small amount of the cream (usually twice daily) for 2 to 4 weeks. Do not cover the treated skin area unless your doctor tells you to. Avoid using bandages or dressings that do not allow air circulation. A light cotton-gauze dressing may be used to protect clothing. Use this medicine for the full prescribed length of time. Your symptoms may improve before the infection is completely cleared. Skipping doses may also increase your risk of further infection that is resistant to antifungal medicine. Call your doctor if your symptoms get worse, or if your condition does not improve after 4 weeks of treatment. Store at room temperature away from moisture and heat. What happens if I miss a dose? Apply the missed dose as soon as you remember. Skip the missed dose if it is almost time for your next scheduled dose. Do not use extra medicine to make up the missed dose. What happens if I overdose? An overdose of clotrimazole topical is not expected to be dangerous. Seek emergency medical attention or call the Poison Help line at 1-486.905.5174 if anyone has accidentally swallowed the medication. What should I avoid while using clotrimazole topical?  Avoid getting this medication in your eyes, nose, or mouth. Avoid using other skin medications on the areas you treat with clotrimazole topical unless your doctor tells you to. Avoid wearing tight-fitting, synthetic clothing that doesn't allow air circulation. Wear clothing made of loose cotton and other natural fibers until the infection is healed. What are the possible side effects of clotrimazole topical?  Get emergency medical help if you have signs of an allergic reaction: hives; difficult breathing; swelling of your face, lips, tongue, or throat. Stop using clotrimazole topical and call your doctor at once if you have:  · severe blistering of treated skin;  · swelling, redness, or oozing; or  · severe burning, itching, or other irritation.   This is not a healthcare administered with the aid of information [facility] provides. The information contained herein is not intended to cover all possible uses, directions, precautions, warnings, drug interactions, allergic reactions, or adverse effects. If you have questions about the drugs you are taking, check with your doctor, nurse or pharmacist.  Copyright 6151-3417 167  Ravindra: 4.02. Revision date: 3/10/2016. Care instructions adapted under license by [facility] ([facility]). If you have questions about a medical condition or this instruction, always ask your healthcare professional. [address] any warranty or liability for your use of this information.

## 2021-06-10 NOTE — PROGRESS NOTES
Chief Complaint:   Rash (on scaple behind ears and under breast x 2 weeks pt stated they itch )      History of Present Illness   Source of history provided by:  patient. Britt Rios is a 45 y.o. old female who has a past medical history of:   Past Medical History:   Diagnosis Date    Environmental allergies     Sinusitis     Presents to the walk in clinic for evaluation of a rash to the bilateral sides of scalp and under breast, which pt first noticed 14 days ago. Denies any known cause for the rash including any new soaps, detergents, lotions, foods, or medications. Since onset the symptoms have progressed. Reports associated erythema and pruritis. Denies any bleeding or drainage. Denies any lymphangitic streaking, pain, fever, chills, HA , dyspnea, dysphagia, recent illness, myalgias, vomiting, or lethargy. Hx of skin cancer. See derm Wednesday. ROS   Unless otherwise stated in this report or unable to obtain because of the patient's clinical or mental status as evidenced by the medical record, this patients's positive and negative responses for Review of Systems, constitutional, psych, eyes, ENT, cardiovascular, respiratory, gastrointestinal, neurological, genitourinary, musculoskeletal, integument systems and systems related to the presenting problem are either stated in the preceding or were not pertinent or were negative for the symptoms and/or complaints related to the medical problem. Past Surgical History:  has a past surgical history that includes sinus surgery. Social History:  reports that she has never smoked. She has never used smokeless tobacco. She reports current alcohol use. She reports that she does not use drugs. Family History: family history is not on file.    Allergies: Dust mite extract, Grass pollen([id number]-o-r-t-swt lisset), Molds & smuts, and Other    Physical Exam    VS:  /83   Pulse 77   Temp 98.3 °F (36.8 °C) (Oral)   Resp 18   Ht 5' 3\" (1.6 m)   Wt 135 dyspnea, dysphagia, CP, spreading erythema, lymphangitic streaking, or additional signs of secondary infection which were discussed. Pt is in agreement with this care plan. All questions answered.     Lupe El, APRN - CNP

## 2021-06-14 RX ORDER — ETONOGESTREL AND ETHINYL ESTRADIOL 11.7; 2.7 MG/1; MG/1
1 INSERT, EXTENDED RELEASE VAGINAL
Qty: 3 EACH | Refills: 3 | Status: SHIPPED | OUTPATIENT
Start: 2021-06-14 | End: 2022-09-01

## 2021-07-01 ENCOUNTER — HOSPITAL ENCOUNTER (OUTPATIENT)
Dept: WOMENS IMAGING | Age: 51
Discharge: HOME | End: 2021-07-03
Payer: COMMERCIAL

## 2021-07-01 DIAGNOSIS — Z12.31 BREAST CANCER SCREENING BY MAMMOGRAM: ICD-10-CM

## 2021-07-01 PROCEDURE — 77063 BREAST TOMOSYNTHESIS BI: CPT

## 2021-10-18 ENCOUNTER — OFFICE VISIT (OUTPATIENT)
Dept: PRIMARY CARE CLINIC | Age: 51
End: 2021-10-18
Payer: COMMERCIAL

## 2021-10-18 VITALS
HEIGHT: 63 IN | BODY MASS INDEX: 23.74 KG/M2 | RESPIRATION RATE: 14 BRPM | OXYGEN SATURATION: 98 % | SYSTOLIC BLOOD PRESSURE: 132 MMHG | WEIGHT: 134 LBS | TEMPERATURE: 98.4 F | HEART RATE: 82 BPM | DIASTOLIC BLOOD PRESSURE: 85 MMHG

## 2021-10-18 DIAGNOSIS — H92.03 OTALGIA OF BOTH EARS: ICD-10-CM

## 2021-10-18 DIAGNOSIS — J01.41 ACUTE RECURRENT PANSINUSITIS: Primary | ICD-10-CM

## 2021-10-18 DIAGNOSIS — R09.81 SINUS CONGESTION: ICD-10-CM

## 2021-10-18 PROCEDURE — G8484 FLU IMMUNIZE NO ADMIN: HCPCS | Performed by: NURSE PRACTITIONER

## 2021-10-18 PROCEDURE — G8427 DOCREV CUR MEDS BY ELIG CLIN: HCPCS | Performed by: NURSE PRACTITIONER

## 2021-10-18 PROCEDURE — 3017F COLORECTAL CA SCREEN DOC REV: CPT | Performed by: NURSE PRACTITIONER

## 2021-10-18 PROCEDURE — 99213 OFFICE O/P EST LOW 20 MIN: CPT | Performed by: NURSE PRACTITIONER

## 2021-10-18 PROCEDURE — G8420 CALC BMI NORM PARAMETERS: HCPCS | Performed by: NURSE PRACTITIONER

## 2021-10-18 PROCEDURE — 1036F TOBACCO NON-USER: CPT | Performed by: NURSE PRACTITIONER

## 2021-10-18 RX ORDER — DEXTROMETHORPHAN HYDROBROMIDE, GUAIFENESIN AND PSEUDOEPHEDRINE HYDROCHLORIDE 15; 400; 60 MG/1; MG/1; MG/1
TABLET ORAL
Qty: 28 TABLET | Refills: 0 | Status: CANCELLED | OUTPATIENT
Start: 2021-10-18

## 2021-10-18 RX ORDER — DEXTROMETHORPHAN HYDROBROMIDE, GUAIFENESIN AND PSEUDOEPHEDRINE HYDROCHLORIDE 15; 400; 60 MG/1; MG/1; MG/1
TABLET ORAL
COMMUNITY
Start: 2021-04-07 | End: 2021-12-23

## 2021-10-18 RX ORDER — KETOCONAZOLE 20 MG/G
CREAM TOPICAL
COMMUNITY
Start: 2021-10-12 | End: 2021-12-23

## 2021-10-18 RX ORDER — AMOXICILLIN AND CLAVULANATE POTASSIUM 875; 125 MG/1; MG/1
1 TABLET, FILM COATED ORAL 2 TIMES DAILY
Qty: 14 TABLET | Refills: 0 | Status: SHIPPED | OUTPATIENT
Start: 2021-10-18 | End: 2021-10-25

## 2021-10-18 RX ORDER — HYDROXYZINE HYDROCHLORIDE 10 MG/1
TABLET, FILM COATED ORAL
COMMUNITY
Start: 2021-10-12

## 2021-10-18 NOTE — PATIENT INSTRUCTIONS
Patient Education        Patient Education        Chronic Sinusitis: Care Instructions  Your Care Instructions     Sinusitis is an infection of the lining of the sinus cavities in your head. It causes pain and pressure in your head and face. Sinusitis can be short-term (acute) or long-term (chronic). Chronic sinusitis lasts 12 weeks or longer. It is often caused by a bacterial or fungal infection. Other things, such as allergies, may also be involved. Chronic sinusitis may be hard to treat. It can lead to permanent changes in the mucous membranes that line the sinuses. It may make future sinus infections more likely. The infection may take some time to treat. Antibiotics are usually used if the infection is caused by bacteria. You may also need to use a corticosteroid nasal spray. If the infection is not cured after you try two or more different antibiotics, you may want to talk with your doctor about surgery or allergy testing. If the sinusitis is caused by a fungal infection, you may need to take antifungals or other medicines. You may also need surgery. Follow-up care is a key part of your treatment and safety. Be sure to make and go to all appointments, and call your doctor if you are having problems. It's also a good idea to know your test results and keep a list of the medicines you take. How can you care for yourself at home? Medicines    · Be safe with medicines. Take your medicines exactly as prescribed. Call your doctor if you think you are having a problem with your medicine. You will get more details on the specific medicines your doctor prescribes.     · Take your antibiotics as directed. Do not stop taking them just because you feel better. You need to take the full course of antibiotics.     · Your doctor may recommend a corticosteroid nasal spray, wash, drops, or pills. Take this medicine exactly as prescribed. At home    · Breathe warm, moist air.  You can use a steamy shower, a hot bath, or a sink filled with hot water. Avoid cold, dry air. Using a humidifier in your home may help. Follow the instructions for cleaning the machine.     · Use saline (saltwater) nasal washes every day. This helps keep your nasal passages open. It also can wash out mucus and bacteria. ? You can buy saline nose drops at a grocery store or drugstore. ? You can make your own at home. Add 1 teaspoon of salt and 1 teaspoon of baking soda to 2 cups of distilled water. If you make your own, fill a bulb syringe with the solution. Then insert the tip into your nostril and squeeze gently. Gaby Chol your nose.     · Put a warm, wet towel or a warm gel pack on your face 3 or 4 times a day. Leave it on 5 to 10 minutes each time.     · Do not smoke or breathe secondhand smoke. Smoking can make sinusitis worse. If you need help quitting, talk to your doctor about stop-smoking programs and medicines. These can increase your chances of quitting for good. When should you call for help? Call your doctor now or seek immediate medical care if:    · You have new or worse symptoms of infection, such as:  ? Increased pain, swelling, warmth, or redness. ? Red streaks leading from the area. ? Pus draining from the area. ? A fever. Watch closely for changes in your health, and be sure to contact your doctor if:    · The mucus from your nose becomes thicker (like pus) or has new blood in it.     · You do not get better as expected. Where can you learn more? Go to [email or web address]. org and sign in to your [employer] account. Enter P408 in the MarketPage box to learn more about \"Chronic Sinusitis: Care Instructions. \"     If you do not have an account, please click on the \"Sign Up Now\" link. Current as of: December 2, 2020               Content Version: 13.0  © 5124-6745 Healthwise, Incorporated. Care instructions adapted under license by [facility] ([facility]).  If you have questions about a medical condition or this instruction, always ask your healthcare professional. Lisa Galvez any warranty or liability for your use of this information. Patient Education        amoxicillin and clavulanate potassium  Pronunciation:  am OK i LEMUEL in [address] ate danielle TAS ee um  Brand:  Augmentin  What is the most important information I should know about amoxicillin and clavulanate potassium? You should not use this medicine if you have severe kidney disease, if you have had liver problems or jaundice while taking amoxicillin and clavulanate potassium, or if you are allergic to any penicillin or cephalosporin antibiotic, such as Amoxil, Ceftin, Cefzil, Moxatag, Omnicef, and others. What is amoxicillin and clavulanate potassium? Amoxicillin is a penicillin antibiotic. Clavulanate potassium helps prevent certain bacteria from becoming resistant to amoxicillin. Amoxicillin and clavulanate potassium is a combination medicine used to treat many different infections caused by bacteria, such as sinusitis, pneumonia, ear infections, bronchitis, urinary tract infections, and infections of the skin. Amoxicillin and clavulanate potassium may also be used for purposes not listed in this medication guide. What should I discuss with my healthcare provider before taking amoxicillin and clavulanate potassium? You should not use this medicine if you are allergic to it, or if:  · you have severe kidney disease (or if you are on dialysis);  · you have had liver problems or jaundice while taking amoxicillin and clavulanate potassium; or  · you are allergic to any penicillin or cephalosporin antibiotic, such as Amoxil, Ceftin, Cefzil, Moxatag, Omnicef, and others. Tell your doctor if you have ever had:  · liver disease (hepatitis or jaundice);  · kidney disease; or  · mononucleosis. The liquid or chewable tablet may contain phenylalanine. Tell your doctor if you have phenylketonuria (PKU).   Tell your doctor if you are pregnant or breastfeeding. Amoxicillin and clavulanate potassium can make birth control pills less effective. Ask your doctor about using a non-hormonal birth control (condom, diaphragm, cervical cap, or contraceptive sponge) to prevent pregnancy. Do not give this medicine to a child without medical advice. How should I take amoxicillin and clavulanate potassium? Follow all directions on your prescription label and read all medication guides or instruction sheets. Use the medicine exactly as directed. Amoxicillin and clavulanate potassium may work best if you take it at the start of a meal.  Take the medicine every 12 hours. Do not crush or chew the extended-release tablet. Swallow the pill whole, or break the pill in half and take both halves one at a time. Tell your doctor if you have trouble swallowing a whole or half pill. You must chew the chewable tablet before you swallow it. Shake the oral suspension (liquid) before you measure a dose. Use the dosing syringe provided, or use a medicine dose-measuring device (not a kitchen spoon). This medicine can affect the results of certain medical tests. Tell any doctor who treats you that you are using amoxicillin and clavulanate potassium. Use this medicine for the full prescribed length of time, even if your symptoms quickly improve. Skipping doses can increase your risk of infection that is resistant to medication. Amoxicillin and clavulanate potassium will not treat a viral infection such as the flu or a common cold. Store the tablets at room temperature away from moisture and heat. Store the liquid  in the refrigerator. Throw away any unused liquid after 10 days. What happens if I miss a dose? Take the medicine as soon as you can, but skip the missed dose if it is almost time for your next dose. Do not take two doses at one time. What happens if I overdose? Seek emergency medical attention or call the Poison Help line at 1-670.629.5716.   Overdose can cause nausea, vomiting, stomach pain, diarrhea, skin rash, drowsiness, hyperactivity, and decreased urination. What should I avoid while taking amoxicillin and clavulanate potassium? Avoid taking this medicine together with or just after eating a high-fat meal. This will make it harder for your body to absorb the medication. Antibiotic medicines can cause diarrhea, which may be a sign of a new infection. If you have diarrhea that is watery or bloody, call your doctor before using anti-diarrhea medicine. What are the possible side effects of amoxicillin and clavulanate potassium? Get emergency medical help if you have signs of an allergic reaction (hives, difficult breathing, swelling in your face or throat) or a severe skin reaction (fever, sore throat, burning eyes, skin pain, red or purple skin rash with blistering and peeling). Call your doctor at once if you have:  · severe stomach pain, diarrhea that is watery or bloody (even if it occurs months after your last dose);  · pale or yellowed skin, dark colored urine, fever, confusion or weakness;  · loss of appetite, upper stomach pain;  · little or no urination; or  · easy bruising or bleeding. Common side effects may include:  · nausea, vomiting; diarrhea;  · rash, itching;  · vaginal itching or discharge; or  · diaper rash. This is not a complete list of side effects and others may occur. Call your doctor for medical advice about side effects. You may report side effects to FDA at 9-139-FDA-2150. What other drugs will affect amoxicillin and clavulanate potassium? Tell your doctor about all your other medicines, especially:  · allopurinol;  · probenecid; or  · a blood thinner --warfarin, Coumadin, Jantoven. This list is not complete. Other drugs may affect amoxicillin and clavulanate potassium, including prescription and over-the-counter medicines, vitamins, and herbal products. Not all possible drug interactions are listed here.   Where can I get this instruction, always ask your healthcare professional. [place] [address] any warranty or liability for your use of this information.

## 2021-10-18 NOTE — LETTER
[facility]  [address] [address] [address]  Phone: [phone]  Fax: [phone]    RUSS Pabon CNP        October 18, 2021     Patient: Art Harris   YOB: 1970   Date of Visit: 10/18/2021       To Whom it May Concern:    Lorenza Root was seen in my clinic on 10/18/2021. She may return to work on 10/19/2021 with improved symptoms and remains afebrile. If you have any questions or concerns, please don't hesitate to call.     Sincerely,         RUSS Pabon CNP

## 2021-10-18 NOTE — PROGRESS NOTES
Chief Complaint   Sinus Problem (Sinus drainage, cough (from drainage), bilateral ear pain on and off x 3 days)      History of Present Illness   Source of history provided by: patient. Eleonora Stephens is a 45 y.o. old female who has a past medical history of:   Past Medical History:   Diagnosis Date    Environmental allergies     Sinusitis     Presents to the clinic for evaluation of worsening sinus pain and pressure with worsening drainage is causing cough for the past week. Over the past 3 days she has noticed that she has had bilateral ear pain that comes and goes. There is been no ear drainage. According to [facility] she has chronic sinusitis and usually requires antibiotic treatment numerous times throughout the year. She has not had any known COVID-19 exposures. . Denies CP, dyspnea, LE edema, abdominal pain, vomiting, rash, or lethargy. No recent history of antibiotic use. ROS   Pertinent positives and negatives are stated within HPI, all other systems reviewed and are negative. Surgical History:  has a past surgical history that includes sinus surgery. Social History:  reports that she has never smoked. She has never used smokeless tobacco. She reports current alcohol use. She reports that she does not use drugs. Family History: family history includes Breast Cancer in her maternal aunt. Allergies: Dust mite extract, Grass pollen(k-o-r-t-swt lisset), Molds & smuts, and Other    Physical Exam    VS:  /85   Pulse 82   Temp 98.4 °F (36.9 °C)   Resp 14   Ht 5' 3\" (1.6 m)   Wt 134 lb (60.8 kg)   SpO2 98%   BMI 23.74 kg/m²    Oxygen Saturation Interpretation: Normal.    Constitutional:  Alert, development consistent with age. NAD. Head:  NC/NT. Airway patent. Bilateral maxillary sinuses are tender to palpation. Bilateral turbinates appear injected. Mouth: Posterior pharynx with mild erythema and yellow postnasal drip. No tonsillar hypertrophy or exudate. Neck:  Normal ROM. Supple. No anterior cervical adenopathy noted. Lungs: CTAB without wheezes, rales, or rhonchi. CV:  Regular rate and rhythm, normal heart sounds. Skin:  Normal turgor. Warm, dry, without visible rash. Lymphatic: No lymphangitis or adenopathy noted. Neurological:  Oriented. Motor functions intact. Lab / Imaging Results   (All laboratory and radiology results have been personally reviewed by myself)  Labs:  No results found for this visit on 10/18/21. Imaging: All Radiology results interpreted by Radiologist unless otherwise noted. No results found. Medical Decision Making   Pt non-toxic, in no apparent distress and stable at time of discharge. Assessment/Plan   Kyree Wolf was seen today for sinus problem. Diagnoses and all orders for this visit:    Acute recurrent pansinusitis  -     amoxicillin-clavulanate (AUGMENTIN) 875-125 MG per tablet; Take 1 tablet by mouth 2 times daily for 7 days    Sinus congestion  -     COVID-19; Future    Otalgia of both ears  -     COVID-19; Future      45y.o. year old female presenting with concern for sinusitis. Samara Severin appears well, hydrated and with clear lung sounds without distress. Due to history and worsening symptoms will treat for concern for bacterial sinus infection today, Augmentin discussed including administration, side effects, probiotics. If no improvement after 48 hours, I recommended COVID-19 testing and COVID-19 outpatient order given with instructions to have done at [facility], will call with results once available. Advised cautionary self-quarantine at home in the interim. Increase fluids and rest. Symptomatic relief discussed including Tylenol prn pain/fever. Schedule virtual f/u with PCP in 7-10 days if symptoms persist. ED sooner if symptoms worsen or change. Maria Isabel Hicks, APRN - CNP    This visit was provided as a focused evaluation during the [place] -19 pandemic/national emergency.   A comprehensive review of all previous patient history and testing was not conducted. Pertinent findings were elicited during the visit.

## 2021-10-19 ENCOUNTER — HOSPITAL ENCOUNTER (OUTPATIENT)
Dept: PREADMISSION TESTING | Age: 51
Setting detail: SPECIMEN
Discharge: HOME | End: 2021-10-19
Payer: COMMERCIAL

## 2021-10-19 DIAGNOSIS — H92.03 OTALGIA OF BOTH EARS: ICD-10-CM

## 2021-10-19 DIAGNOSIS — R09.81 SINUS CONGESTION: ICD-10-CM

## 2021-10-19 PROCEDURE — C9803 HOPD COVID-19 SPEC COLLECT: HCPCS

## 2021-10-19 PROCEDURE — U0005 INFEC AGEN DETEC AMPLI PROBE: HCPCS

## 2021-10-19 PROCEDURE — U0003 INFECTIOUS AGENT DETECTION BY NUCLEIC ACID (DNA OR RNA); SEVERE ACUTE RESPIRATORY SYNDROME CORONAVIRUS 2 (SARS-COV-2) (CORONAVIRUS DISEASE [COVID-19]), AMPLIFIED PROBE TECHNIQUE, MAKING USE OF HIGH THROUGHPUT TECHNOLOGIES AS DESCRIBED BY CMS-2020-01-R: HCPCS

## 2021-10-20 LAB
SARS-COV-2: NORMAL
SARS-COV-2: NOT DETECTED
SOURCE: NORMAL

## 2021-12-23 ENCOUNTER — OFFICE VISIT (OUTPATIENT)
Dept: PRIMARY CARE CLINIC | Age: 51
End: 2021-12-23
Payer: COMMERCIAL

## 2021-12-23 ENCOUNTER — HOSPITAL ENCOUNTER (OUTPATIENT)
Age: 51
Setting detail: SPECIMEN
Discharge: HOME | End: 2021-12-23
Payer: COMMERCIAL

## 2021-12-23 VITALS
HEIGHT: 63 IN | SYSTOLIC BLOOD PRESSURE: 159 MMHG | HEART RATE: 107 BPM | WEIGHT: 136 LBS | TEMPERATURE: 98.8 F | OXYGEN SATURATION: 96 % | RESPIRATION RATE: 18 BRPM | DIASTOLIC BLOOD PRESSURE: 95 MMHG | BODY MASS INDEX: 24.1 KG/M2

## 2021-12-23 DIAGNOSIS — J34.89 STUFFY AND RUNNY NOSE: ICD-10-CM

## 2021-12-23 DIAGNOSIS — H93.8X3 CONGESTION OF BOTH EARS: ICD-10-CM

## 2021-12-23 DIAGNOSIS — R09.81 SINUS CONGESTION: ICD-10-CM

## 2021-12-23 DIAGNOSIS — R05.9 COUGH: ICD-10-CM

## 2021-12-23 DIAGNOSIS — J01.41 ACUTE RECURRENT PANSINUSITIS: Primary | ICD-10-CM

## 2021-12-23 LAB
SARS-COV-2, RAPID: NOT DETECTED
SPECIMEN DESCRIPTION: NORMAL

## 2021-12-23 PROCEDURE — G8484 FLU IMMUNIZE NO ADMIN: HCPCS | Performed by: NURSE PRACTITIONER

## 2021-12-23 PROCEDURE — G8420 CALC BMI NORM PARAMETERS: HCPCS | Performed by: NURSE PRACTITIONER

## 2021-12-23 PROCEDURE — 3017F COLORECTAL CA SCREEN DOC REV: CPT | Performed by: NURSE PRACTITIONER

## 2021-12-23 PROCEDURE — C9803 HOPD COVID-19 SPEC COLLECT: HCPCS

## 2021-12-23 PROCEDURE — G8427 DOCREV CUR MEDS BY ELIG CLIN: HCPCS | Performed by: NURSE PRACTITIONER

## 2021-12-23 PROCEDURE — 99213 OFFICE O/P EST LOW 20 MIN: CPT | Performed by: NURSE PRACTITIONER

## 2021-12-23 PROCEDURE — 87635 SARS-COV-2 COVID-19 AMP PRB: CPT

## 2021-12-23 PROCEDURE — 1036F TOBACCO NON-USER: CPT | Performed by: NURSE PRACTITIONER

## 2021-12-23 RX ORDER — AMOXICILLIN AND CLAVULANATE POTASSIUM 875; 125 MG/1; MG/1
1 TABLET, FILM COATED ORAL 2 TIMES DAILY
Qty: 20 TABLET | Refills: 0 | Status: SHIPPED | OUTPATIENT
Start: 2021-12-23 | End: 2022-01-02

## 2021-12-23 RX ORDER — DEXTROMETHORPHAN HYDROBROMIDE, GUAIFENESIN AND PSEUDOEPHEDRINE HYDROCHLORIDE 15; 400; 60 MG/1; MG/1; MG/1
TABLET ORAL
Qty: 28 TABLET | Refills: 0 | Status: SHIPPED | OUTPATIENT
Start: 2021-12-23

## 2021-12-23 NOTE — PATIENT INSTRUCTIONS
Patient Education        Learning About Coronavirus [phone]  What is coronavirus (COVID-19)? COVID-19 is a disease caused by a type of coronavirus. This illness was first found in December 2019. It has since spread worldwide. Coronaviruses are a large group of viruses. They cause the common cold. They also cause more serious illnesses like [place] East respiratory syndrome (MERS) and severe acute respiratory syndrome (SARS). COVID-19 is caused by a novel coronavirus. That means it's a new type that has not been seen in people before. What are the symptoms? COVID-19 symptoms may include:  · Fever. · Cough. · Trouble breathing. · Chills or repeated shaking with chills. · Muscle and body aches. · Headache. · Sore throat. · New loss of taste or smell. · Vomiting. · Diarrhea. In severe cases, COVID-19 can cause pneumonia and make it hard to breathe without help from a machine. It can cause death. How is it diagnosed? COVID-19 is diagnosed with a viral test. This may also be called a PCR test or antigen test. It looks for evidence of the virus in your breathing passages or lungs (respiratory system). The test is most often done on a sample from the nose, throat, or lungs. It's sometimes done on a sample of saliva. One way a sample is collected is by putting a long swab into the back of your nose. How is it treated? Mild cases of COVID-19 can be treated at home. Serious cases need treatment in the hospital. Treatment may include medicines to reduce symptoms, plus breathing support such as oxygen therapy or a ventilator. Some people may be placed on their belly to help their oxygen levels. Treatments that may help people who have COVID-19 include:  Antiviral medicines. These medicines treat viral infections. Remdesivir is an example. Immune-based therapy. These medicines help the immune system fight COVID-19. Examples include monoclonal antibodies. Blood thinners.   These medicines help prevent a separate bedroom and use a separate bathroom. · Clean and disinfect your home every day. Use household  and disinfectant wipes or sprays. Take special care to clean things that you touch with your hands. How can you self-isolate when you have COVID-19? If you have COVID-19, there are things you can do to help avoid spreading the virus to others. · Limit contact with people in your home. If possible, stay in a separate bedroom and use a separate bathroom. · Wear a mask when you are around other people. · If you have to leave home, avoid crowds and try to stay at least 6 feet away from other people. · Avoid contact with pets and other animals. · Cover your mouth and nose with a tissue when you cough or sneeze. Then throw it in the trash right away. · Wash your hands often, especially after you cough or sneeze. Use soap and water, and scrub for at least 20 seconds. If soap and water aren't available, use an alcohol-based hand . · Don't share personal household items. These include bedding, towels, cups and glasses, and eating utensils. · [address] in the warmest water allowed for the fabric type, and dry it completely. It's okay to wash other people's laundry with yours. · Clean and disinfect your home. Use household  and disinfectant wipes or sprays. When should you call for help? Call 911 anytime you think you may need emergency care. For example, call if you have life-threatening symptoms, such as:    · You have severe trouble breathing. (You can't talk at all.)     · You have constant chest pain or pressure.     · You are severely dizzy or lightheaded.     · You are confused or can't think clearly.     · You have pale, gray, or blue-colored skin or lips.     · You pass out (lose consciousness) or are very hard to wake up. Call your doctor now or seek immediate medical care if:    · You have moderate trouble breathing.  (You can't speak a full sentence.)     · You are coughing up blood (more than about 1 teaspoon).     · You have signs of low blood pressure. These include feeling lightheaded; being too weak to stand; and having cold, pale, clammy skin. Watch closely for changes in your health, and be sure to contact your doctor if:    · Your symptoms get worse.     · You are not getting better as expected.     · You have new or worse symptoms of anxiety, depression, nightmares, or flashbacks. Call before you go to the doctor's office. Follow their instructions. And wear a mask. Current as of: July 1, 2021               Content Version: 13.1  © 2006-2021 [employer]. Care instructions adapted under license by [facility] ([facility]). If you have questions about a medical condition or this instruction, always ask your healthcare professional. [address] any warranty or liability for your use of this information. Patient Education        Learning About Monoclonal Antibody Treatment for COVID-19  How are monoclonal antibodies used to treat COVID-19? Monoclonal antibodies are medicines that act like natural antibodies. Antibodies are proteins that your immune system makes. They help defend against a threat, like bacteria and viruses. Monoclonal antibodies for COVID-19 target the virus that causes the infection. When the virus enters your body, it invades healthy cells. There, it starts to make more virus. This can make you sick. These antibodies may block the virus from getting into your cells. Then it can't make more virus. This treatment is used for people who aren't in the hospital for COVID-19 but are at high risk for getting more serious symptoms. It can also be used to prevent COVID-19 in some people who are at high risk for getting very sick and have been in close contact with someone who has COVID-19. This treatment is given through a needle in a vein (IV). Or it may be given as an injection.   Your doctor can help you learn if this treatment might be right for you. Current as of: October 6, 2021               Content Version: 13.1  © 9527-5012 Healthwise, Incorporated. Care instructions adapted under license by [facility] ([facility]). If you have questions about a medical condition or this instruction, always ask your healthcare professional. [address] any warranty or liability for your use of this information.

## 2021-12-23 NOTE — PROGRESS NOTES
Chief Complaint   Sinusitis (x one week post nasal drip, runny nose, head congestion, bilateral ear pressure )      History of Present Illness   Source of history provided by: patient. Gopal Leal is a 45 y.o. old female who has a past medical history of:   Past Medical History:   Diagnosis Date    Environmental allergies     Sinusitis     Presents to the clinic for evaluation of 1 week of head aches, head congestion, sinus pressure/congestion and bilateral ear congestion for 1 week. No fevers. Coughing for sinus drainage. Denies  CP, dyspnea, LE edema, abdominal pain, vomiting, rash, or lethargy. Hx of re current sinusitis. Numerous co workers ill. ROS   Pertinent positives and negatives are stated within HPI, all other systems reviewed and are negative. Surgical History:  has a past surgical history that includes sinus surgery. Social History:  reports that she has never smoked. She has never used smokeless tobacco. She reports current alcohol use. She reports that she does not use drugs. Family History: family history includes Breast Cancer in her maternal aunt. Allergies: Dust mite extract, Grass pollen([id number]-t-swt lisset), Molds & smuts, and Other    Physical Exam    VS:  BP (!) 159/95   Pulse 107   Temp 98.8 °F (37.1 °C) (Oral)   Resp 18   Ht 5' 3\" (1.6 m)   Wt 136 lb (61.7 kg)   SpO2 96%   BMI 24.09 kg/m²        Constitutional:  Alert, development consistent with age. NAD. Head:  NC/NT. Airway patent. Mouth: Posterior pharynx with mild erythema and clear postnasal drip. No tonsillar hypertrophy or exudate. Neck:  Normal ROM. Supple. No anterior cervical adenopathy noted. Lungs: CTAB without wheezes, rales, or rhonchi. CV:  Regular rate at 95 bpm and regular rhythm, normal heart sounds, without pathological murmurs, ectopy, gallops, or rubs. Skin:  Normal turgor. Warm, dry, without visible rash. Lymphatic: No lymphangitis or adenopathy noted.   Neurological: Oriented. Motor functions intact. Lab / Imaging Results   (All laboratory and radiology results have been personally reviewed by myself)  Labs:  No results found for this visit on 12/23/21. Imaging: All Radiology results interpreted by Radiologist unless otherwise noted. No results found. Medical Decision Making   Pt non-toxic, in no apparent distress and stable at time of discharge. Assessment/Plan   Wil Verdugo was seen today for sinusitis. Diagnoses and all orders for this visit:    Acute recurrent pansinusitis  -     amoxicillin-clavulanate (AUGMENTIN) 875-125 MG per tablet; Take 1 tablet by mouth 2 times daily for 10 days    Stuffy and runny nose  -     Cancel: COVID-19; Future    Congestion of both ears  -     Cancel: COVID-19; Future    Sinus congestion  -     Cancel: COVID-19; Future  -     Pseudoephedrine-DM-GG (CAPMIST DM) 60- MG TABS; 1 capsule every 6 hours by mouth as needed for cough and congestion. Do not exceed 4 capsules per day. Cough  -     Cancel: COVID-19; Future  -     Pseudoephedrine-DM-GG (CAPMIST DM) 60- MG TABS; 1 capsule every 6 hours by mouth as needed for cough and congestion. Do not exceed 4 capsules per day. 45y.o. year old female presenting with concern for sinusitis. Wil Verdugo  appears well, hydrated and with clear lung sounds without distress. Treating symptoms with Capmist DM, administration/side effects discussed. Rapid COVID-19 outpatient order given with instructions to have done at [facility], will call with results once available. If negative, will prescribe Augmentin for sinusitis as she does have hx of recurrent. Symptomatic relief discussed including Ibuprofen 3 times a day (antiinflammatory), Warm tea with 1tbsp honey (soothes the throat), Increase water intake, Rest. ED sooner if symptoms worsen or change.        Gino Augustin, APRN - CNP    This visit was provided as a focused evaluation during the [place] -19 pandemic/national emergency. A comprehensive review of all previous patient history and testing was not conducted. Pertinent findings were elicited during the visit.

## 2022-02-10 ENCOUNTER — OFFICE VISIT (OUTPATIENT)
Dept: OTOLARYNGOLOGY | Age: 52
End: 2022-02-10
Payer: COMMERCIAL

## 2022-02-10 VITALS
HEART RATE: 89 BPM | BODY MASS INDEX: 24.27 KG/M2 | HEIGHT: 63 IN | TEMPERATURE: 98.1 F | WEIGHT: 137 LBS | DIASTOLIC BLOOD PRESSURE: 80 MMHG | SYSTOLIC BLOOD PRESSURE: 129 MMHG

## 2022-02-10 DIAGNOSIS — J32.9 CHRONIC SINUSITIS, UNSPECIFIED LOCATION: Primary | ICD-10-CM

## 2022-02-10 DIAGNOSIS — J30.9 ALLERGIC RHINITIS, UNSPECIFIED SEASONALITY, UNSPECIFIED TRIGGER: ICD-10-CM

## 2022-02-10 PROCEDURE — G8428 CUR MEDS NOT DOCUMENT: HCPCS | Performed by: PHYSICIAN ASSISTANT

## 2022-02-10 PROCEDURE — 1036F TOBACCO NON-USER: CPT | Performed by: PHYSICIAN ASSISTANT

## 2022-02-10 PROCEDURE — 3017F COLORECTAL CA SCREEN DOC REV: CPT | Performed by: PHYSICIAN ASSISTANT

## 2022-02-10 PROCEDURE — G8420 CALC BMI NORM PARAMETERS: HCPCS | Performed by: PHYSICIAN ASSISTANT

## 2022-02-10 PROCEDURE — G8484 FLU IMMUNIZE NO ADMIN: HCPCS | Performed by: PHYSICIAN ASSISTANT

## 2022-02-10 PROCEDURE — 99203 OFFICE O/P NEW LOW 30 MIN: CPT | Performed by: PHYSICIAN ASSISTANT

## 2022-02-10 RX ORDER — AMOXICILLIN AND CLAVULANATE POTASSIUM 875; 125 MG/1; MG/1
1 TABLET, FILM COATED ORAL 2 TIMES DAILY
Qty: 28 TABLET | Refills: 0 | Status: SHIPPED | OUTPATIENT
Start: 2022-02-10 | End: 2022-02-24

## 2022-02-10 RX ORDER — AZELASTINE 1 MG/ML
SPRAY, METERED NASAL
Qty: 1 EACH | Refills: 5 | Status: SHIPPED | OUTPATIENT
Start: 2022-02-10

## 2022-02-10 NOTE — PATIENT INSTRUCTIONS
AVOID DOING THE IRRIGATIONS 48 HOURS PRIOR TO SINUS CT. NASAL SALINE (SALTWATER) RINSES     Your doctor has recommended the use of saline (salt water) nasal rinses. Nasal rinses have been used for centuries for the treatment of nasal and sinus infection, bothersome secretions, allergy, and nasal dryness. It is safe and effective for use in both children and adults. Daily rinsing of the nasal passages with saline promotes nasal and sinus health by washing away dried mucus, infected secretions, dust, pollen, and mold spores, by moisturizing the nasal lining, promoting normal mucus flow, and naturally decongesting inflamed tissues. Rinses also help with clearing dried blood, mucus, and infection after sinus surgery and promote rapid healing of tissue in this setting. In the beginning it is normal to have some difficulty with performing nasal rinses, but with practice, most people find that it becomes part of their normal routine just as much as brushing teeth or showering, and often, most wouldnt think of leaving home without it. RINSE INGREDIENTS:  No special tools are required for nasal saline rinses, and everything that you need can be picked up at your local pharmacy and grocery. Commercial saline rinses and syringe systems such as NeilMed SINUS RINSE, Ayr Saline Nasal Rinse, or Neti Pot are available, but these may contain preservatives or other irritants, and it is often less expensive and more convenient to make it at home.   You will need the following:  Distilled water (tap water contains irritating minerals and bacteria, but may be used if boiled)  Kosher, pickling, or non-iodized table salt (regular table salt contains iodine, an irritant)  Baking soda (not baking powder)  Corn syrup (optional, for additional moisturizing effect)  White vinegar (for antiseptic effect, if recommended by your doctor)  An airtight container for mixing saline rinse  Rubber bulb syringe (like those used to clear infant noses and ears)  Household bleach (for cleaning container and bulb syringe)    PREPARATION:  To prepare the rinses, measure out 1 quart (1 liter) of distilled or boiled tap water into the mixing container. Then add 2-3 heaping teaspoons of salt and 1 teaspoon of baking soda, mix to dissolve, and place in refrigerator for 1-2 days of storage. You may add 3-4 tablespoons of corn syrup if you experience nasal dryness. If your doctor recommends it, add 1 teaspoon of white vinegar to this mixture as well. STORAGE & CLEANING:  It is advised that you make up fresh rinse every day or two, and that you keep the unused rinse in the refrigerator in an airtight container to prevent bacterial growth. Set out enough rinse for your next use well in advance to let it come to room temperature before use. Wash the container and bulb syringe at least once a week in a quart of water with 2 tablespoons of bleach, rinse and dry to prevent bacterial growth    USING SALINE RINSES:  To perform nasal saline rinses, plan on using at least 1 pint (2 cups) twice daily. Lean over a sink or other basin (some people prefer to do this in the shower as it can be messy, especially when you first start) to catch the rinse as it drains from your nose and mouth. Fill the bulb syringe with the rinse solution and place it into the nostril on one side. Gently squeeze the bulb to flush the nasal passage until the rinse drains clear. Repeat on the other side. You should plan on using the rinses twice a day, which should take about 10 minutes to perform. OTHER MEDICATIONS:  If your doctor has prescribed other nasal spray medications, such as a nasal steroid, it is best to apply these after the nasal rinse so that you do not wash the medication away. It is also safe to continue with your other antihistamine or decongestant medications that your doctor may have prescribed in addition to the saline rinses.   If you have an allergy or adverse reaction to any of the ingredients do not use it in the preparation of the saline rinses. WHEN TO CALL US:  Stop the rinses and notify your doctor if you experience severe pain in the nose or ears, bleeding, or any other problems with the use of the nasal saline rinses. It is normal, especially in the beginning, to experience drainage of saline and nasal secretions into the throat. This is best avoided by holding your breath and leaning forward when using the rinses. Experienced users often will have the rinse exit the opposite nostril without drainage into the throat at all, and this can be achieved by most people with practice. Please call us if you have any additional questions or concerns. NOTICE:  THIS DOCUMENT IS INTENDED SOLELY FOR PATIENT EDUCATIONAL PURPOSES AND IS PROVIDED AS A COURTESY TO PATIENTS OF DR. Fran Randolph MD AND Jareth Vincent PA-C. IT IS NOT INTENDED AS A SUBSTITUTE FOR PROFESSIONAL MEDICAL CARE OR ADVICE. THE PATIENT SHOULD SEEK ADVICE FROM THE PHYSICIAN IF THERE ARE ANY QUESTIONS ABOUT THE CONTENTS OR DIRECTIONS PROVIDED IN THIS DOCUMENT.       Non-medication allergy/irritant avoidance/reduction measures? nasal saline spray, nasal saline irrigation (use sterile or distilled water; if using tap water then boil x 5 minutes; not using recommended water could potentially result in infection; if you feel it causes ear symptoms then you can stop using), use of impermeable mattress and pillow covers, wash bedding weekly in hot water, trial of air purifier/allergen filter, if you have or suspect outdoor/seasonal allergies dont line dry clothes outside, ideally keep pets outside but if you cant at least keep out of the bedroom and off furniture (you may not notice an immediate improvement because the dander or hair may be sticking to walls/furniture), some people routinely shampoo their pets and believe that it helps (check with your pets vet before doing this as this may be harmful to your pets skin), brush your pet outside, after petting your animals be sure to wash your hands and not touch your face before you have washed your hands, hard jaskaran is preferred (versus carpet) as carpet can harbor allergens like dust/dust mites and pet dander, if you have carpet some people report improvement in symptoms with routine shampooing, use a sweeper/vacuum with a HEPA filter (it filters allergens like dust mites, pollen, and pet dander), avoidance of dust collecting items that arent easily washable or wipeable, if possible have someone else dust (using cloth with spray; using just a dry duster/dry cloth may just stir up dust and not pick it up) and leave for a couple of hours after dusting until things settle, wearing a wide-brimmed hat and glasses when outdoors may help, avoiding outdoor activities/limiting outdoor activities when pollen counts are high if you after outdoor allergies, after you have been outside doing activities like mowing or gardening come in and shower and change your clothes so you are not tracking allergens around the house, if you have outdoor allergies after being outdoors you can rinse nasal mucosa with saline spray/irrigation, if you have outdoor allergies after being outdoors you can rinse your eyes with artificial tears, try wearing a mask during occupational or hobby exposures (dust, particulate matter, noxious fumes) to see if this helps, make sure adequate ventilation (like opening windows) with occupational or hobby exposures, take measures to control mold in your home/place of work, dont forget the importance of hand hygiene as infections (like a cold) can have some of the same symptoms as allergies    Some Web sites you can visit to get more information on allergies:  AAAAI (The [facility] Academy of Allergy, Asthma & Immunology)   AAFA (Asthma and Allergy Foundation of [facility])  ACAAI ([address] [place] [address] of Allergy, Asthma & Immunology)  AAO-HNSF (American Academy of Otolaryngology - Head and Neck Surgery)      SURVEY:    You may be receiving a survey from [employer] regarding your visit today. Please complete the survey to enable us to provide the highest quality of care to you and your family. If you cannot score us a very good on any question, please call the office to discuss how we could have made your experience a very good one. Thank you.     Your Medical Assistant today was Juliano Saba

## 2022-02-10 NOTE — PROGRESS NOTES
[facility] ENT PART OF [facility]  [address]  [address]  [address]  Dept: [phone]  MD Michelle Zabala PA-C Cathie Albright 45 y.o. female     Patient presents with a chief complaint of Sinus Problem (Patient has history of sinus and allergy issues. Was on allergy shots several years ago. Has noticed increased congestion, sinus pain and pressure. Does nasal rinse daily and notes that it does not drain fully after. Uses Flonase daily. Has TMJ)     CC documented by nursing staff and noted. /80   Pulse 89   Temp 98.1 °F (36.7 °C)   Ht 5' 3\" (1.6 m)   Wt 137 lb (62.1 kg)   BMI 24.27 kg/m²       History of Presenting Illness: The patient/caregiver reports a history of complaint with the following features: Onset/Quality/Location:  Chief complaint is \"back to back sinus infections\". Goes on to say that it feels like she has had a sinus infection since around the end of Oct 2021. Since that time has been treated with Augmentin 2x (Pt thinks duration was 1 week each time). Thinks the antibiotics help in the beginning. Was last on Augmentin in Dec 2021. Reports symptoms of facial pain - locations are forehead, bilateral cheeks, teeth (upper and lower bilaterally; generalized), and sometimes in the nose. Reports puffiness in eyes and cheek areas. Nasal/sinus drainage of a yellow-green quality. Last noticed it yesterday. Has been occurring intermittently x 3 months. Cough x 3 months - sometimes productive (sometimes white, sometimes yellow) and sometimes non-productive. Sometimes can smell really good and sometimes doesn't smell good. Sense of smell issues going on before Pt had COVID. Right now congestion in nose on right side, but can get it on the other side. Timing:   Had COVID 3 weeks ago.   Frequency/Duration:   Severity:   Associated symptoms/other symptoms:  Fever or chills? Tmax 100 F when Pt had COVID, otherwise denies known fever. Every now and then gets chills. Risk factors/other information:  Hx of sinus surgery and describes septoplasty (both done in 2006 at the same time). Pt reports having a fungal infection after surgery that was treated by her PCP. Pt indicates she took Diflucan for this. Unclear to me if culture was done and how the diagnosis of a fungal infection was determined. Had green nasal drainage with a foul odor at that time that cleared up with Diflucan. Pt does report sinus infections did decrease after sinus surgery. Has seen an ENT in [facility], since having sinus surgery (maybe 6-7 years ago). Pt not sure who she saw and indicates having a CT at that time. Hx of sinus/nasal polyps? Denies  Family Hx of sinus/nasal polyps? Denies polyps, but sinus issues run in the family. Hx of environmental/seasonal allergies? Allergic to \"everything\" per Pt. Has been on allergy injections before (childhood into adulthood and then was off of them and then went back on them). Went to allergist in [place] [address] last year or the year before and was going to get back on shots, but didn't due to the cost.    Non-smoker. \"A lot\" of dental work in the past 2 years. Hx of \"TMJ\" from prior dental work according to Pt. Denies known sleep apnea. Denies known snoring and denies gasping for air while sleeping. No prior sleep study done. What has been tried? Outcome? Augmentin helps in the beginning. Has been treated 2x with this (7 days each) since October 2021. Has been using fluticasone nasal spray daily x years. Helps with the nose not being so sore inside. Putting Neosporin in nose helps with the bloody noses. Bloody noses seem to have resolved. Last had one 1-2 weeks ago. Has used other nasal sprays before but can't remember the names of these other nasal sprays. Yesterday took Capmist DM and it helped.   Took Zyrtec-D this AM (doesn't take it daily). The Zyrtec-D usually helps unless \"super congested\" and then Capmist helps if Zyrtec-D doesn't. Has also taken plain Zyrtec before and helps sometimes. Hasn't taken plain Claritin or Allegra before. Has taken Singulair before (last took several months ago). Was getting a lot of headaches and Pt thought this was due to the Singulair so stopped it and went \"through a spell\" where wasn't having as many headaches. Generic Mucinex taken sometimes. Does nasal/sinus saline irrigations every morning. She says sometimes \"I get crap out\". Did the irrigation this AM and nothing came out. Sometimes later in the day the irrigation solution runs out. Last on oral steroids 1-2 weeks ago. Was on this for her sinuses. Thinks she had some from urgent care too when she had COVID. Helped with decreasing nasal soreness and helped with nasal/sinus congestion and thinks it also helped with the facial pain. Thinks she has tried a nasal antihistamine before and thinks it helped with symptoms she was having at that time. Has humidifier going in the bedroom. Gets \"work done\" on \"pressure points\" and helps with the congestion. Aggravating factors:  Sinus symptoms worsen with turning the heat on. Allergies. Pt says C1 \"not staying in\" and that this causes her sinus issues and congestion. Decreased sinus infections after surgery. Narrative   ** FINAL **   Procedure:  WCT  Jan 28 2015 10:05AM [id number]  CT SINUSES COMPLEX   Reason for Exam:  chronic sinusitis       FULL RESULT:   SINUS CT WITHOUT CONTRAST 01/28/2015       HISTORY: Chronic sinusitis   TECHNIQUE: Axial CT images of the sinuses were acquired without    intravenous contrast. Sagittal and coronal reformats were constructed.       COMPARISON: Sinus CT 02/06/2012. FINDINGS: The frontal sinuses are clear. There is mild mucosal thickening    of the left ethmoid sinus. The right ethmoid sinus is clear. The    bilateral sphenoid sinuses are clear.  The right maxillary sinus is clear. There is circumferential mucosal thickening within the left maxillary    sinus with an air/fluid level, increased in prominence compared to the    prior study. No sinus expansion, atelectasis, sclerosis, or dehiscence. There is opacification of the left ostiomeatal unit due to mucosal    thickening.       The orbits and globes are unremarkable. The partially visualized    intracranial contents are unremarkable. The nasopharynx is unremarkable. The sella is within normal limits for size.               IMPRESSION:           Left maxillary and ethmoid sinusitis increased in prominence compared to    the prior CT of 2012. No bone destruction. [facility] Jan 28 2015  1:23P    Read by: Mary Shelton M.D.  [id number] on Jan 28 2015  1:23P    Electronically Signed by: Momo Bills. Mary Shelton M.D. on:  Jan 28 2015     1:23P          Review of systems covering 10 systems is reviewed as staff entry in other note and pertinent positives and negatives noted. Past Medical History:   Diagnosis Date    Environmental allergies     Sinusitis        Current Outpatient Medications:     Pseudoephedrine-DM-GG (CAPMIST DM) 60- MG TABS, 1 capsule every 6 hours by mouth as needed for cough and congestion.  Do not exceed 4 capsules per day., Disp: 28 tablet, Rfl: 0    hydrOXYzine (ATARAX) 10 MG tablet, take 1 tablet by mouth at bedtime, Disp: , Rfl:     etonogestrel-ethinyl estradiol (NUVARING) 0.12-0.015 MG/24HR vaginal ring, Place 1 each vaginally every 21 days Insert one (1) ring vaginally and leave in place for three (3) weeks, then remove for one (1) week., Disp: 3 each, Rfl: 3    fluticasone (FLONASE) 50 MCG/ACT nasal spray, 2 sprays by Nasal route daily, Disp: , Rfl:     Cetirizine HCl (ZYRTEC PO), Take  by mouth., Disp: , Rfl:     Multiple Vitamin (MULTI-VITAMIN DAILY PO), Take  by mouth., Disp: , Rfl:        Allergies   Allergen Reactions    Dust Mite Extract     Grass Pollen(K-O-R-T-Swt Betito)     Molds & Smuts     Other       Past Surgical History:   Procedure Laterality Date    SINUS SURGERY        Social History     Socioeconomic History    Marital status: Single     Spouse name: Not on file    Number of children: Not on file    Years of education: Not on file    Highest education level: Not on file   Occupational History    Not on file   Tobacco Use    Smoking status: Never Smoker    Smokeless tobacco: Never Used   Vaping Use    Vaping Use: Never used   Substance and Sexual Activity    Alcohol use: Yes     Comment: occ.  Drug use: Never    Sexual activity: Yes     Partners: Male     Birth control/protection: Inserts     Comment: nuva ring   Other Topics Concern    Not on file   Social History Narrative    Not on file     Social Determinants of Health     Financial Resource Strain:     Difficulty of Paying Living Expenses: Not on file   Food Insecurity:     Worried About Running Out of Food in the Last Year: Not on file    Luis of Food in the Last Year: Not on file   Transportation Needs:     Lack of Transportation (Medical): Not on file    Lack of Transportation (Non-Medical):  Not on file   Physical Activity:     Days of Exercise per Week: Not on file    Minutes of Exercise per Session: Not on file   Stress:     Feeling of Stress : Not on file   Social Connections:     Frequency of Communication with Friends and Family: Not on file    Frequency of Social Gatherings with Friends and Family: Not on file    Attends Confucianist Services: Not on file    Active Member of Clubs or Organizations: Not on file    Attends Club or Organization Meetings: Not on file    Marital Status: Not on file   Intimate Partner Violence:     Fear of Current or Ex-Partner: Not on file    Emotionally Abused: Not on file    Physically Abused: Not on file    Sexually Abused: Not on file   Housing Stability:     Unable to Pay for Housing in the Last Year: Not on file    Number of Places Lived in the Last Year: Not on file    Unstable Housing in the Last Year: Not on file     Family History   Problem Relation Age of Onset    Breast Cancer Maternal Aunt           PHYSICAL EXAM:    The patient was examined today 2/10/2022 with findings as follows:    CONSTITUTIONAL:    General Appearance: well-appearing, nontoxic, alert, no acute distress     Communication: understanding at normal conversational tones, normal voicing, speech intelligible    HEAD/FACE:    Head: atraumatic, normocephalic    Facial Inspection: no lesions, healthy skin    Facial Strength: motor strength normal, symmetric strength, symmetric movement    Sinuses: voices generalized sinus tenderness bilaterally, but no other physical response of sinus tenderness   bilaterally    Salivary Glands: no enlargement of parotid glands, no tenderness of parotid glands, no masses of parotid glands, no enlargement of submandibular glands, no tenderness of submandibular glands, no masses of submandibular glands    Temporomandibular Joint: no crepitus with motion, no tenderness on palpation, no trismus, motion symmetric    EYES:  PERRL, extra-ocular movements intact, no nystagmus, sclera white, no redness of eyes, no watering of eyes  Mucosa of bilateral lower eyelids is normal without cobblestoning or inflammatory appearance.     EARS:    Bilateral External Ears: no pits, no tags    Right External Ear: normally formed, no lesions; no mastoid tenderness, redness or swelling    Left External Ear: normally formed, no lesions; no mastoid tenderness, redness or swelling    Right External Auditory Canal: normally formed, no erythema, no edema, no lesions, healthy skin, no obstructing cerumen, no discharge    Left External Auditory Canal: normally formed, no erythema, no edema, no lesions, healthy skin, no obstructing cerumen, no discharge    Right Tympanic Membrane: normal landmarks, translucent pearly gray, immobile to pneumatic otoscopy, no perforation, no effusion, TM not bulging or retracted    Left Tympanic Membrane: normal landmarks, translucent pearly gray, immobile to pneumatic otoscopy, no perforation, no effusion, TM not bulging or retracted    Hearing: intact to spoken voice    NOSE:    Nasal Skin: no lesions, no lacerations, no scars    Nasal Dorsum: symmetric with no visible or palpable deformities    Nasal Tip: normal symmetric nasal tip, normal nasal valves    Nasal Mucosa: normal, pink and moist, no drainage, no lesions or masses    Septum: not markedly deformed, mild leftward deviation, no exposed vessels, no bleeding, no septal granuloma, no perforation    Turbinates:  mild to moderate congestion of right inferior turbinate, otherwise normal size and conformation    ORAL CAVITY/MOUTH:    Lips, teeth, gums: normal lips, normal gums, dentition intact    Oral Mucosa: normal, moist, no lesions    Palate: normal hard palate, normal soft palate, symmetric palatal elevation    Floor of Mouth: normal floor of mouth    Tongue: normal tongue, no lesions, no edema, no masses, normal mucosa, mobile    Tonsils: normal tonsils, 1+ or less tonsils bilaterally, symmetric, no lesions or masses, no erythema or exudate    Posterior pharynx: normally formed, no erythema, no exudate, no edema, no PND, no lesions or masses    NECK:    Neck: no masses, trachea midline, functional active range of motion, no cysts or pits, no tenderness to palpation    Thyroid: normal thyroid, no enlargement, no tenderness, no nodules    LYMPH NODES:    Cervical: no palpable lymph node enlargement    RESPIRATORY:    Inspection/Auscultation: good air movement (anteriorly, posteriorly, laterally), chest expands symmetrically, normal breath sounds, no wheezing, no stridor, no rhonchi, no crackles    CARDIOVASCULAR SYSTEM:    Heart regular rate and rhythm, normal S1 and S2, no audible murmur    Observation/Palpation of Peripheral Vascular System:  no cyanosis, no edema    SKIN:    General Appearance:  warm and dry    NEUROLOGICAL SYSTEM:    Orientation: oriented to time, oriented to place, oriented to person, oriented situation    Cranial Nerves: Cranial Nerves II-XII intact, normal facial movement    PSYCHIATRIC:    Mood and affect:  Affect appropriate for situation/setting. Assessment and Plan:  The patient and/or caregiver is advised on the use of any prescribed medication and the rationale for radiographic imaging. The patient and/or caregiver is advised on the use of saline nasal rinses for moisturization, decongestion, and cleansing of the nasal and sinus cavities and an informational sheet on the preparation and use of saline is provided. The patient and/or caregiver is to notify the office if no improvement or worsening of symptoms is noted prior to the scheduled follow-up for sooner evaluation. The risk of bowel infection on prolonged antibiotic therapy is discussed and the patient is asked to notify me for persistent diarrhea. Will try longer course of oral antibiotic. Considered and discussed trial of alternative antibiotic as well. Trial of nasal antihistamine discussed. Pt cautioned about using this spray at a different time of day than the nasal steroid and that it's possible that she may have increased irritation with using both nasal sprays and may not tolerate using both nasal sprays. Advised to avoid doing sinus irrigations 48 hours prior to getting sinus CT. Printed information on non-medication allergy/irritant measures given to Pt. The patient and/or caregiver is to notify the office if no improvement or worsening of symptoms is noted prior to the scheduled follow-up for sooner evaluation. The patient and/or caregiver is able to state an understanding of these recommendations and is agreeable to the treatment plan. Diagnosis Orders   1.  Chronic sinusitis, unspecified location  amoxicillin-clavulanate (AUGMENTIN) 875-125 MG per tablet    azelastine (ASTELIN) 0.1 % nasal spray    CT SINUS WO CONTRAST   2. Allergic rhinitis, unspecified seasonality, unspecified trigger  azelastine (ASTELIN) 0.1 % nasal spray    CT SINUS WO CONTRAST      Return for follow-up after getting sinus CT.

## 2022-03-07 ENCOUNTER — HOSPITAL ENCOUNTER (OUTPATIENT)
Dept: CT IMAGING | Age: 52
Discharge: HOME | End: 2022-03-09
Payer: COMMERCIAL

## 2022-03-07 DIAGNOSIS — J32.9 CHRONIC SINUSITIS, UNSPECIFIED LOCATION: ICD-10-CM

## 2022-03-07 DIAGNOSIS — J30.9 ALLERGIC RHINITIS, UNSPECIFIED SEASONALITY, UNSPECIFIED TRIGGER: ICD-10-CM

## 2022-03-07 PROCEDURE — 70486 CT MAXILLOFACIAL W/O DYE: CPT

## 2022-03-24 ENCOUNTER — TELEPHONE (OUTPATIENT)
Dept: SURGERY | Age: 52
End: 2022-03-24

## 2022-03-24 NOTE — TELEPHONE ENCOUNTER
Patient called for CT results ordered by Doris Bond. I relayed results to patient and told her that if she had any questions or concerns, she could contact Dr Aury Gregory office and I provided her with that number.

## 2022-05-26 ENCOUNTER — HOSPITAL ENCOUNTER (OUTPATIENT)
Age: 52
Discharge: HOME | End: 2022-05-26
Payer: COMMERCIAL

## 2022-05-26 LAB
ABSOLUTE EOS #: 0.24 K/UL (ref 0–0.44)
ABSOLUTE IMMATURE GRANULOCYTE: 0.03 K/UL (ref 0–0.3)
ABSOLUTE LYMPH #: 2 K/UL (ref 1.1–3.7)
ABSOLUTE MONO #: 0.62 K/UL (ref 0.1–1.2)
ALBUMIN SERPL-MCNC: 4.5 G/DL (ref 3.5–5.2)
ALBUMIN/GLOBULIN RATIO: 1.5 (ref 1–2.5)
ALP BLD-CCNC: 106 U/L (ref 35–104)
ALT SERPL-CCNC: 19 U/L (ref 5–33)
ANION GAP SERPL CALCULATED.3IONS-SCNC: 9 MMOL/L (ref 9–17)
AST SERPL-CCNC: 18 U/L
BASOPHILS # BLD: 1 % (ref 0–2)
BASOPHILS ABSOLUTE: 0.04 K/UL (ref 0–0.2)
BILIRUB SERPL-MCNC: 0.33 MG/DL (ref 0.3–1.2)
BUN BLDV-MCNC: 12 MG/DL (ref 6–20)
BUN/CREAT BLD: 21 (ref 9–20)
CALCIUM SERPL-MCNC: 10.5 MG/DL (ref 8.6–10.4)
CHLORIDE BLD-SCNC: 101 MMOL/L (ref 98–107)
CHOLESTEROL/HDL RATIO: 4.3
CHOLESTEROL: 255 MG/DL
CO2: 30 MMOL/L (ref 20–31)
CREAT SERPL-MCNC: 0.56 MG/DL (ref 0.5–0.9)
EOSINOPHILS RELATIVE PERCENT: 4 % (ref 1–4)
GFR AFRICAN AMERICAN: >60 ML/MIN
GFR NON-AFRICAN AMERICAN: >60 ML/MIN
GFR SERPL CREATININE-BSD FRML MDRD: ABNORMAL ML/MIN/{1.73_M2}
GFR SERPL CREATININE-BSD FRML MDRD: ABNORMAL ML/MIN/{1.73_M2}
GLUCOSE BLD-MCNC: 90 MG/DL (ref 70–99)
HCT VFR BLD CALC: 40.9 % (ref 36.3–47.1)
HDLC SERPL-MCNC: 59 MG/DL
HEMOGLOBIN: 13 G/DL (ref 11.9–15.1)
IMMATURE GRANULOCYTES: 0 %
LDL CHOLESTEROL: 164 MG/DL (ref 0–130)
LYMPHOCYTES # BLD: 29 % (ref 24–43)
MCH RBC QN AUTO: 28.8 PG (ref 25.2–33.5)
MCHC RBC AUTO-ENTMCNC: 31.8 G/DL (ref 28.4–34.8)
MCV RBC AUTO: 90.7 FL (ref 82.6–102.9)
MONOCYTES # BLD: 9 % (ref 3–12)
NRBC AUTOMATED: 0 PER 100 WBC
PDW BLD-RTO: 13.2 % (ref 11.8–14.4)
PLATELET # BLD: 335 K/UL (ref 138–453)
PMV BLD AUTO: 9.7 FL (ref 8.1–13.5)
POTASSIUM SERPL-SCNC: 4.2 MMOL/L (ref 3.7–5.3)
RBC # BLD: 4.51 M/UL (ref 3.95–5.11)
SEG NEUTROPHILS: 57 % (ref 36–65)
SEGMENTED NEUTROPHILS ABSOLUTE COUNT: 3.92 K/UL (ref 1.5–8.1)
SODIUM BLD-SCNC: 140 MMOL/L (ref 135–144)
TOTAL PROTEIN: 7.5 G/DL (ref 6.4–8.3)
TRIGL SERPL-MCNC: 160 MG/DL
TSH SERPL DL<=0.05 MIU/L-ACNC: 2.61 UIU/ML (ref 0.3–5)
WBC # BLD: 6.9 K/UL (ref 3.5–11.3)

## 2022-05-26 PROCEDURE — 80061 LIPID PANEL: CPT

## 2022-05-26 PROCEDURE — 85025 COMPLETE CBC W/AUTO DIFF WBC: CPT

## 2022-05-26 PROCEDURE — 80053 COMPREHEN METABOLIC PANEL: CPT

## 2022-05-26 PROCEDURE — 84443 ASSAY THYROID STIM HORMONE: CPT

## 2022-05-26 PROCEDURE — 36415 COLL VENOUS BLD VENIPUNCTURE: CPT

## 2022-09-01 ENCOUNTER — OFFICE VISIT (OUTPATIENT)
Dept: OBGYN | Age: 52
End: 2022-09-01
Payer: COMMERCIAL

## 2022-09-01 ENCOUNTER — HOSPITAL ENCOUNTER (OUTPATIENT)
Age: 52
Setting detail: SPECIMEN
Discharge: HOME | End: 2022-09-01
Payer: COMMERCIAL

## 2022-09-01 VITALS
WEIGHT: 130 LBS | DIASTOLIC BLOOD PRESSURE: 74 MMHG | HEIGHT: 63 IN | SYSTOLIC BLOOD PRESSURE: 118 MMHG | BODY MASS INDEX: 23.04 KG/M2

## 2022-09-01 DIAGNOSIS — N89.8 VAGINAL DISCHARGE: ICD-10-CM

## 2022-09-01 DIAGNOSIS — Z01.419 ENCOUNTER FOR WELL WOMAN EXAM WITH ROUTINE GYNECOLOGICAL EXAM: Primary | ICD-10-CM

## 2022-09-01 DIAGNOSIS — Z01.419 ENCOUNTER FOR WELL WOMAN EXAM WITH ROUTINE GYNECOLOGICAL EXAM: ICD-10-CM

## 2022-09-01 PROCEDURE — 87491 CHLMYD TRACH DNA AMP PROBE: CPT

## 2022-09-01 PROCEDURE — 87591 N.GONORRHOEAE DNA AMP PROB: CPT

## 2022-09-01 PROCEDURE — 86403 PARTICLE AGGLUT ANTBDY SCRN: CPT

## 2022-09-01 PROCEDURE — G0145 SCR C/V CYTO,THINLAYER,RESCR: HCPCS

## 2022-09-01 PROCEDURE — 87081 CULTURE SCREEN ONLY: CPT

## 2022-09-01 PROCEDURE — 99396 PREV VISIT EST AGE 40-64: CPT | Performed by: OBSTETRICS & GYNECOLOGY

## 2022-09-01 RX ORDER — SODIUM FLUORIDE 6 MG/ML
PASTE, DENTIFRICE DENTAL
COMMUNITY
Start: 2022-08-04

## 2022-09-01 RX ORDER — SPIRONOLACTONE 50 MG/1
TABLET, FILM COATED ORAL
COMMUNITY
Start: 2022-08-02

## 2022-09-01 RX ORDER — DICYCLOMINE HYDROCHLORIDE 10 MG/1
CAPSULE ORAL
COMMUNITY
Start: 2022-05-25

## 2022-09-01 NOTE — PROGRESS NOTES
YEARLY PHYSICAL    Date of service: 2022    Janice Card  Is a 47 y.o.  single female    PT's PCP is: RUSS Arambula NP     : 1970                                             Subjective:       No LMP recorded. (Menstrual status: Irregular periods). Are your menses regular: no    OB History    Para Term  AB Living   0 0 0 0 0 0   SAB IAB Ectopic Molar Multiple Live Births   0 0 0 0 0 0        Social History     Tobacco Use   Smoking Status Never   Smokeless Tobacco Never        Social History     Substance and Sexual Activity   Alcohol Use Yes    Comment: occ. Family History   Problem Relation Age of Onset    Breast Cancer Maternal Aunt        Allergies: Dust mite extract, Grass pollen(k-o-r-t-swt lisset), Molds & smuts, and Other      Current Outpatient Medications:     spironolactone (ALDACTONE) 50 MG tablet, take 1 tablet by mouth once daily, Disp: , Rfl:     SODIUM FLUORIDE 5000 PPM 1.1 % PSTE, BRUSH WITH PEA SIZED AMOUNT at bedtime for 2 MINUTES DO NOT EAT OR DRINK FOR 30 MINUTES, Disp: , Rfl:     dicyclomine (BENTYL) 10 MG capsule, take 1 capsule by mouth four times a day if needed for abdominal pain, Disp: , Rfl:     azelastine (ASTELIN) 0.1 % nasal spray, Spray 2 sprays in each nostril once daily. , Disp: 1 each, Rfl: 5    Pseudoephedrine-DM-GG (CAPMIST DM) 60- MG TABS, 1 capsule every 6 hours by mouth as needed for cough and congestion.  Do not exceed 4 capsules per day., Disp: 28 tablet, Rfl: 0    hydrOXYzine (ATARAX) 10 MG tablet, take 1 tablet by mouth at bedtime, Disp: , Rfl:     fluticasone (FLONASE) 50 MCG/ACT nasal spray, 2 sprays by Nasal route daily, Disp: , Rfl:     Cetirizine HCl (ZYRTEC PO), Take  by mouth., Disp: , Rfl:     Multiple Vitamin (MULTI-VITAMIN DAILY PO), Take  by mouth., Disp: , Rfl:     Social History     Substance and Sexual Activity   Sexual Activity Yes Partners: Male    Birth control/protection: Inserts    Comment: nuva ring       Any bleeding or pain with intercourse: No    Last Yearly:  4/26/21    Last pap: 4/26/21    Last HPV: 3/29/21    Last Mammogram: 07/2021    Last Kg Gonzalez never    Do you do self breast exams: Yes    Past Medical History:   Diagnosis Date    Environmental allergies     Sinusitis        Past Surgical History:   Procedure Laterality Date    SINUS SURGERY         Family History   Problem Relation Age of Onset    Breast Cancer Maternal Aunt        Any family history of breast or ovarian cancer: Yes    Any family history of blood clots: No      Chief Complaint   Patient presents with    Gynecologic Exam     Patient presents today for yearly exam. Last pap 4/26/21. Having a lot of lower back pain. Nurse: Martin Rodriguez  PE:  Vital Signs  Blood pressure 118/74, height 5' 3\" (1.6 m), weight 130 lb (59 kg). Labs:    No results found for this visit on 09/01/22. HPI: Patient is here today for a yearly exam.  She does have some malodorous vaginal discharge from time to time and would like to be cultured and screened for HOSP HERRERA ANGEL and chlamydia. YesPT denies fever, chills, nausea and vomiting       Objective  Lymphatic:   no lymphadenopathy  Heent:   negative   Cor: regular rate and rhythm, no murmurs              Pul:clear to auscultation bilaterally- no wheezes, rales or rhonchi, normal air movement, no respiratory distress      GI: Abdomen soft, non-tender.  BS normal. No masses,  No organomegaly           Extremities: normal strength, tone, and muscle mass   Breasts: Breast:normal appearance, no masses or tenderness, Inspection negative, No nipple retraction or dimpling, No nipple discharge or bleeding, No axillary or supraclavicular adenopathy, Normal to palpation without dominant masses   Pelvic Exam: GENITAL/URINARY:  External Genitalia:  General appearance; normal, Hair distribution; normal, Lesions absent  Vagina:  General appearance normal, Discharge absent, Lesions absent, Pelvic support normal  Cervix:  General appearance normal, Lesions absent, Discharge absent, Tenderness absent, Enlargement absent, Nodularity absent  Uterus:  Size normal, Contour normal, Position normal, Masses absent, Consistency; normal, Support normal, Tenderness absent  Adenexa: Masses absent, Tenderness absent, Enlargement absent, Nodularity absent                                      Vaginal discharge: no vaginal discharge      Uterus: normal size, anteverted, mobile, non-tender, normal shape and consistency     Ovaries: Nonenlarged nontender           Over 50% of time spent on counseling and care coordination on: see assessment and plan                        Assessment and Plan: No evidence of any GC or chlamydia on exam        Diagnosis Orders   1. Encounter for well woman exam with routine gynecological exam  PAP SMEAR      2. Vaginal discharge  C.trachomatis N.gonorrhoeae DNA, Thin Prep    Culture, Strep B Screen, Vaginal/Rectal          I have discontinued Myrtice Olvin etonogestrel-ethinyl estradiol. I am also having her maintain her Cetirizine HCl (ZYRTEC PO), Multiple Vitamin (MULTI-VITAMIN DAILY PO), fluticasone, hydrOXYzine HCl, Capmist DM, azelastine, spironolactone, Sodium Fluoride 5000 PPM, and dicyclomine.

## 2022-09-04 LAB
CULTURE: NORMAL
SPECIMEN DESCRIPTION: NORMAL

## 2022-09-19 LAB — CYTOLOGY REPORT: NORMAL

## 2022-09-22 ENCOUNTER — HOSPITAL ENCOUNTER (OUTPATIENT)
Dept: WOMENS IMAGING | Age: 52
Discharge: HOME | End: 2022-09-24
Payer: COMMERCIAL

## 2022-09-22 DIAGNOSIS — Z12.31 BREAST CANCER SCREENING BY MAMMOGRAM: ICD-10-CM

## 2022-09-22 PROCEDURE — 77063 BREAST TOMOSYNTHESIS BI: CPT

## 2022-11-23 ENCOUNTER — OFFICE VISIT (OUTPATIENT)
Dept: PRIMARY CARE CLINIC | Age: 52
End: 2022-11-23
Payer: COMMERCIAL

## 2022-11-23 VITALS
DIASTOLIC BLOOD PRESSURE: 74 MMHG | HEIGHT: 63 IN | OXYGEN SATURATION: 97 % | RESPIRATION RATE: 18 BRPM | TEMPERATURE: 98.6 F | SYSTOLIC BLOOD PRESSURE: 138 MMHG | HEART RATE: 80 BPM | WEIGHT: 132 LBS | BODY MASS INDEX: 23.39 KG/M2

## 2022-11-23 DIAGNOSIS — R09.89 RUNNY NOSE: ICD-10-CM

## 2022-11-23 DIAGNOSIS — U07.1 COVID-19 VIRUS INFECTION: Primary | ICD-10-CM

## 2022-11-23 DIAGNOSIS — R05.9 COUGH, UNSPECIFIED TYPE: ICD-10-CM

## 2022-11-23 LAB
INFLUENZA A ANTIBODY: NEGATIVE
INFLUENZA B ANTIBODY: NEGATIVE
KIT LOT NO., HCLOLOT: ABNORMAL
SARS-COV-2, POC: DETECTED
VALID INTERNAL CONTROL, POC: PRESENT
VENDOR AND KIT NAME POC: ABNORMAL

## 2022-11-23 PROCEDURE — G8420 CALC BMI NORM PARAMETERS: HCPCS | Performed by: NURSE PRACTITIONER

## 2022-11-23 PROCEDURE — G8484 FLU IMMUNIZE NO ADMIN: HCPCS | Performed by: NURSE PRACTITIONER

## 2022-11-23 PROCEDURE — G8427 DOCREV CUR MEDS BY ELIG CLIN: HCPCS | Performed by: NURSE PRACTITIONER

## 2022-11-23 PROCEDURE — 87804 INFLUENZA ASSAY W/OPTIC: CPT | Performed by: NURSE PRACTITIONER

## 2022-11-23 PROCEDURE — 1036F TOBACCO NON-USER: CPT | Performed by: NURSE PRACTITIONER

## 2022-11-23 PROCEDURE — 3017F COLORECTAL CA SCREEN DOC REV: CPT | Performed by: NURSE PRACTITIONER

## 2022-11-23 PROCEDURE — 99213 OFFICE O/P EST LOW 20 MIN: CPT | Performed by: NURSE PRACTITIONER

## 2022-11-23 RX ORDER — DEXTROMETHORPHAN HYDROBROMIDE, GUAIFENESIN AND PSEUDOEPHEDRINE HYDROCHLORIDE 15; 400; 60 MG/1; MG/1; MG/1
TABLET ORAL
Qty: 28 TABLET | Refills: 0 | Status: SHIPPED | OUTPATIENT
Start: 2022-11-23

## 2022-11-23 NOTE — LETTER
[address]    [place] [address]  Phone: [phone]  Fax: 051 [facility], [address], APRN - CNP        November 23, 2022     Patient: Fabienne Richardson   YOB: 1970   Date of Visit: 11/23/2022       To Whom It May Concern: It is my medical opinion that Jarvis Hernandez may return to work on 11/28/222. If you have any questions or concerns, please don't hesitate to call.     Sincerely,        Rehan Barcenas, APRN - CNP

## 2022-11-23 NOTE — PATIENT INSTRUCTIONS
SURVEY:    You may be receiving a survey from [employer] regarding your visit today. Please complete the survey to enable us to provide the highest quality of care to you and your family. If you cannot score us a very good on any question, please call the office to discuss how we could of made your experience a very good one. Thank you for letting us take care of you today. We hope all your questions were addressed. If a question was overlooked or something else comes to mind after you return home, please contact a member of your Care Team listed below.     Thank you,  Bernadette Smith MA      Your Care Team at [address]  Provider- PAULINE Wei  Provider- PAULINE Hayden  [address], [address] [facility]  Reception- Valarie Hammans, [address] [facility] [place]      Walk-in contact numbers:       Phone: [phone]                 Fax: [phone]    Maryann Mccabe Hours:  Mon-Thurs: 9:00 am - 5:30 pm     Friday: 8:00 am - 12:00 pm           Sat-Sun: CLOSED

## 2023-03-20 ENCOUNTER — OFFICE VISIT (OUTPATIENT)
Dept: PRIMARY CARE CLINIC | Age: 53
End: 2023-03-20
Payer: COMMERCIAL

## 2023-03-20 VITALS
TEMPERATURE: 98.2 F | RESPIRATION RATE: 18 BRPM | DIASTOLIC BLOOD PRESSURE: 81 MMHG | WEIGHT: 132 LBS | HEART RATE: 74 BPM | HEIGHT: 63 IN | SYSTOLIC BLOOD PRESSURE: 126 MMHG | OXYGEN SATURATION: 96 % | BODY MASS INDEX: 23.39 KG/M2

## 2023-03-20 DIAGNOSIS — J01.40 ACUTE NON-RECURRENT PANSINUSITIS: Primary | ICD-10-CM

## 2023-03-20 PROCEDURE — 99213 OFFICE O/P EST LOW 20 MIN: CPT | Performed by: NURSE PRACTITIONER

## 2023-03-20 RX ORDER — GUAIFENESIN AND PSEUDOEPHEDRINE HCL 1200; 120 MG/1; MG/1
1 TABLET, EXTENDED RELEASE ORAL 2 TIMES DAILY PRN
Qty: 20 TABLET | Refills: 0 | Status: SHIPPED | OUTPATIENT
Start: 2023-03-20 | End: 2023-03-30

## 2023-03-20 RX ORDER — AMOXICILLIN AND CLAVULANATE POTASSIUM 875; 125 MG/1; MG/1
TABLET, FILM COATED ORAL
COMMUNITY
Start: 2023-03-18

## 2023-03-20 ASSESSMENT — ENCOUNTER SYMPTOMS
SORE THROAT: 0
NAUSEA: 0
VOMITING: 0
SINUS PAIN: 1
SHORTNESS OF BREATH: 0
COUGH: 1
SINUS COMPLAINT: 1
DIARRHEA: 0
SINUS PRESSURE: 1

## 2023-03-20 NOTE — PROGRESS NOTES
follow up. All patient questions answered. Pt voiced understanding.       Electronically signed by RUSS Ricketts CNP on 3/20/2023 at 7:16 PM

## 2023-03-20 NOTE — PATIENT INSTRUCTIONS
SURVEY:    You may be receiving a survey from [employer] regarding your visit today. Please complete the survey to enable us to provide the highest quality of care to you and your family. If you cannot score us a very good on any question, please call the office to discuss how we could of made your experience a very good one. Thank you for letting us take care of you today. We hope all your questions were addressed. If a question was overlooked or something else comes to mind after you return home, please contact a member of your Care Team listed below. Thank you,  Celestina Golden MA      Your Care Team at [address] [facility]  Provider- PAULINE Matos  Provider- PAULINE Saunders  [address] [place] [address]  Reception- Giovanni Walker [place]      Walk-in contact numbers:       Phone: [phone]                 Fax: [phone]    [place] Walk-in Hours:  Mon-Thurs: 9:00 am - 5:30 pm     Friday: 8:00 am - 12:00 pm           Sat-Sun: CLOSED    Encouraged to increase fluids and rest  Continue antibiotic as prescribed until all doses are completed - take with food  Probiotic OTC or greek yogurt daily while on antibiotic  Mucinex D as prescribed as needed for congestion and sinus pain/pressure. Nasal saline spray OTC every couple of hours for nasal congestion  Fluticasone nasal spray, 1 spray each nostril, twice a day for 7 to 10 days  Warm facial packs applied to face for 5 to 10 minutes, 3 times per day  Aleve/Ibuprofen/Tylenol OTC PRN for pain, discomfort or fever  Patient instructions given for acute sinusitis and augmentin. To ER or call 911 if any difficulty breathing, shortness of breath, inability to swallow, hives, rash, facial/tongue swelling or temp greater than 103 degrees.   Follow up as needed with PCP if symptoms worsen or do not improve

## 2023-03-20 NOTE — LETTER
March 20, 2023       Malika Zapata YOB: 1970   [address] Date of Visit:  3/20/2023       To Whom It May Concern:    Digna Warren was seen in my clinic on 3/20/2023. She may return to work on 03/22/2023. Please excuse for 03/20/2023 and 03/21/2023. If you have any questions or concerns, please don't hesitate to call.     Sincerely,        Sacha Tobias, RUSS - CNP

## 2023-05-02 ENCOUNTER — HOSPITAL ENCOUNTER (OUTPATIENT)
Age: 53
Discharge: HOME | End: 2023-05-02
Payer: COMMERCIAL

## 2023-05-02 LAB
25(OH)D3 SERPL-MCNC: 104 NG/ML
ABSOLUTE EOS #: 0.14 K/UL (ref 0–0.44)
ABSOLUTE IMMATURE GRANULOCYTE: <0.03 K/UL (ref 0–0.3)
ABSOLUTE LYMPH #: 1.7 K/UL (ref 1.1–3.7)
ABSOLUTE MONO #: 0.52 K/UL (ref 0.1–1.2)
ALBUMIN SERPL-MCNC: 4.5 G/DL (ref 3.5–5.2)
ALBUMIN/GLOBULIN RATIO: 1.6 (ref 1–2.5)
ALP SERPL-CCNC: 118 U/L (ref 35–104)
ALT SERPL-CCNC: 12 U/L (ref 5–33)
ANION GAP SERPL CALCULATED.3IONS-SCNC: 10 MMOL/L (ref 9–17)
AST SERPL-CCNC: 16 U/L
BASOPHILS # BLD: 1 % (ref 0–2)
BASOPHILS ABSOLUTE: 0.05 K/UL (ref 0–0.2)
BILIRUB SERPL-MCNC: 0.3 MG/DL (ref 0.3–1.2)
BUN SERPL-MCNC: 12 MG/DL (ref 6–20)
BUN/CREAT BLD: 20 (ref 9–20)
CALCIUM SERPL-MCNC: 10.4 MG/DL (ref 8.6–10.4)
CHLORIDE SERPL-SCNC: 103 MMOL/L (ref 98–107)
CHOLEST SERPL-MCNC: 256 MG/DL
CHOLESTEROL/HDL RATIO: 4.7
CO2 SERPL-SCNC: 29 MMOL/L (ref 20–31)
CREAT SERPL-MCNC: 0.61 MG/DL (ref 0.5–0.9)
EOSINOPHILS RELATIVE PERCENT: 2 % (ref 1–4)
GFR SERPL CREATININE-BSD FRML MDRD: >60 ML/MIN/1.73M2
GLUCOSE SERPL-MCNC: 92 MG/DL (ref 70–99)
HCT VFR BLD AUTO: 41.1 % (ref 36.3–47.1)
HDLC SERPL-MCNC: 55 MG/DL
HGB BLD-MCNC: 13.4 G/DL (ref 11.9–15.1)
IMMATURE GRANULOCYTES: 0 %
LDLC SERPL CALC-MCNC: 170 MG/DL (ref 0–130)
LYMPHOCYTES # BLD: 26 % (ref 24–43)
MCH RBC QN AUTO: 29.5 PG (ref 25.2–33.5)
MCHC RBC AUTO-ENTMCNC: 32.6 G/DL (ref 28.4–34.8)
MCV RBC AUTO: 90.5 FL (ref 82.6–102.9)
MONOCYTES # BLD: 8 % (ref 3–12)
NRBC AUTOMATED: 0 PER 100 WBC
PDW BLD-RTO: 13.8 % (ref 11.8–14.4)
PLATELET # BLD AUTO: 404 K/UL (ref 138–453)
PMV BLD AUTO: 9.3 FL (ref 8.1–13.5)
POTASSIUM SERPL-SCNC: 4.1 MMOL/L (ref 3.7–5.3)
PROT SERPL-MCNC: 7.3 G/DL (ref 6.4–8.3)
RBC # BLD: 4.54 M/UL (ref 3.95–5.11)
SEG NEUTROPHILS: 63 % (ref 36–65)
SEGMENTED NEUTROPHILS ABSOLUTE COUNT: 4.08 K/UL (ref 1.5–8.1)
SODIUM SERPL-SCNC: 142 MMOL/L (ref 135–144)
TRIGL SERPL-MCNC: 156 MG/DL
TSH SERPL-ACNC: 3.15 UIU/ML (ref 0.3–5)
WBC # BLD AUTO: 6.5 K/UL (ref 3.5–11.3)

## 2023-05-02 PROCEDURE — 84443 ASSAY THYROID STIM HORMONE: CPT

## 2023-05-02 PROCEDURE — 80053 COMPREHEN METABOLIC PANEL: CPT

## 2023-05-02 PROCEDURE — 36415 COLL VENOUS BLD VENIPUNCTURE: CPT

## 2023-05-02 PROCEDURE — 80061 LIPID PANEL: CPT

## 2023-05-02 PROCEDURE — 85025 COMPLETE CBC W/AUTO DIFF WBC: CPT

## 2023-05-02 PROCEDURE — 82306 VITAMIN D 25 HYDROXY: CPT

## 2023-05-02 PROCEDURE — 82652 VIT D 1 25-DIHYDROXY: CPT

## 2023-05-04 LAB — VITAMIN D 1,25-DIHYDROXY: 55 PG/ML (ref 19.9–79.3)

## 2023-09-06 ENCOUNTER — HOSPITAL ENCOUNTER (OUTPATIENT)
Age: 53
Setting detail: SPECIMEN
Discharge: HOME | End: 2023-09-06
Payer: COMMERCIAL

## 2023-09-06 ENCOUNTER — OFFICE VISIT (OUTPATIENT)
Dept: OBGYN | Age: 53
End: 2023-09-06
Payer: COMMERCIAL

## 2023-09-06 VITALS
WEIGHT: 131 LBS | HEIGHT: 63 IN | BODY MASS INDEX: 23.21 KG/M2 | DIASTOLIC BLOOD PRESSURE: 74 MMHG | SYSTOLIC BLOOD PRESSURE: 118 MMHG

## 2023-09-06 DIAGNOSIS — N89.8 VAGINAL DISCHARGE: ICD-10-CM

## 2023-09-06 DIAGNOSIS — Z01.419 WOMEN'S ANNUAL ROUTINE GYNECOLOGICAL EXAMINATION: Primary | ICD-10-CM

## 2023-09-06 PROCEDURE — 99396 PREV VISIT EST AGE 40-64: CPT | Performed by: OBSTETRICS & GYNECOLOGY

## 2023-09-06 PROCEDURE — G0145 SCR C/V CYTO,THINLAYER,RESCR: HCPCS

## 2023-09-06 PROCEDURE — 87070 CULTURE OTHR SPECIMN AEROBIC: CPT

## 2023-09-06 RX ORDER — ASCORBIC ACID 500 MG
50 TABLET ORAL DAILY
COMMUNITY

## 2023-09-06 RX ORDER — ESOMEPRAZOLE MAGNESIUM 40 MG/1
40 CAPSULE, DELAYED RELEASE ORAL
COMMUNITY

## 2023-09-06 RX ORDER — UREA 10 %
500 LOTION (ML) TOPICAL DAILY
COMMUNITY

## 2023-09-06 RX ORDER — ACYCLOVIR 800 MG/1
TABLET ORAL
COMMUNITY
Start: 2023-06-23

## 2023-09-06 RX ORDER — LORATADINE 10 MG/1
10 TABLET ORAL DAILY
COMMUNITY

## 2023-09-06 RX ORDER — CALCITRIOL 0.25 UG/1
0.25 CAPSULE, LIQUID FILLED ORAL DAILY
COMMUNITY

## 2023-09-06 NOTE — PROGRESS NOTES
and care coordination on: see assessment and plan                        Assessment and Plan: Routine care        Diagnosis Orders   1. Women's annual routine gynecological examination  PAP SMEAR          I have discontinued Amrit Bee's hydrOXYzine HCl, azelastine, Capmist DM, and amoxicillin-clavulanate.  I am also having her maintain her Cetirizine HCl (ZYRTEC PO), Multiple Vitamin (MULTI-VITAMIN DAILY PO), fluticasone, spironolactone, Sodium Fluoride 5000 PPM, dicyclomine, acyclovir, calcitRIOL, calcium carbonate, esomeprazole, loratadine, and Chelated Zinc.

## 2023-09-07 DIAGNOSIS — N89.8 VAGINAL DISCHARGE: ICD-10-CM

## 2023-09-07 DIAGNOSIS — Z01.419 WOMEN'S ANNUAL ROUTINE GYNECOLOGICAL EXAMINATION: ICD-10-CM

## 2023-09-10 LAB
MICROORGANISM SPEC CULT: NORMAL
SPECIMEN DESCRIPTION: NORMAL

## 2023-09-16 LAB — CYTOLOGY REPORT: NORMAL

## 2023-10-16 ENCOUNTER — TELEPHONE (OUTPATIENT)
Dept: GASTROENTEROLOGY | Age: 53
End: 2023-10-16

## 2023-10-16 NOTE — TELEPHONE ENCOUNTER
Patient called office stating she will be unable to leave work for appt this afternoon. Patient requested to r/s into the new year - March. Patient concerned about traveling in weather. Schedule template was not up for march. Patient will be contacted when schedule is up for march 2024. Patient voiced understanding pos cologuard and reports no bowel concerns outside of IBS diagnosis and no fam hx of colon ca.

## 2024-01-31 ENCOUNTER — HOSPITAL ENCOUNTER (OUTPATIENT)
Dept: WOMENS IMAGING | Age: 54
Discharge: HOME | End: 2024-02-02
Payer: COMMERCIAL

## 2024-01-31 VITALS — HEIGHT: 63 IN | BODY MASS INDEX: 23.04 KG/M2 | WEIGHT: 130 LBS

## 2024-01-31 DIAGNOSIS — Z12.31 ENCOUNTER FOR SCREENING MAMMOGRAM FOR MALIGNANT NEOPLASM OF BREAST: ICD-10-CM

## 2024-01-31 PROCEDURE — 77063 BREAST TOMOSYNTHESIS BI: CPT

## 2024-06-27 ENCOUNTER — HOSPITAL ENCOUNTER (OUTPATIENT)
Age: 54
Discharge: HOME | End: 2024-06-27
Payer: COMMERCIAL

## 2024-06-27 LAB
ALBUMIN SERPL-MCNC: 4.4 G/DL (ref 3.5–5.2)
ALBUMIN/GLOB SERPL: 1.6 {RATIO} (ref 1–2.5)
ALP SERPL-CCNC: 113 U/L (ref 35–104)
ALT SERPL-CCNC: 14 U/L (ref 5–33)
ANION GAP SERPL CALCULATED.3IONS-SCNC: 11 MMOL/L (ref 9–17)
AST SERPL-CCNC: 15 U/L
BILIRUB SERPL-MCNC: 0.3 MG/DL (ref 0.3–1.2)
BUN SERPL-MCNC: 15 MG/DL (ref 6–20)
BUN/CREAT SERPL: 21 (ref 9–20)
CALCIUM SERPL-MCNC: 9.5 MG/DL (ref 8.6–10.4)
CHLORIDE SERPL-SCNC: 101 MMOL/L (ref 98–107)
CHOLEST SERPL-MCNC: 261 MG/DL (ref 0–199)
CHOLESTEROL/HDL RATIO: 5
CO2 SERPL-SCNC: 27 MMOL/L (ref 20–31)
CREAT SERPL-MCNC: 0.7 MG/DL (ref 0.5–0.9)
ERYTHROCYTE [DISTWIDTH] IN BLOOD BY AUTOMATED COUNT: 13.2 % (ref 11.8–14.4)
GFR, ESTIMATED: >90 ML/MIN/1.73M2
GLUCOSE SERPL-MCNC: 89 MG/DL (ref 70–99)
HCT VFR BLD AUTO: 42.9 % (ref 36.3–47.1)
HDLC SERPL-MCNC: 55 MG/DL
HGB BLD-MCNC: 14.2 G/DL (ref 11.9–15.1)
LDLC SERPL CALC-MCNC: 179 MG/DL (ref 0–100)
MCH RBC QN AUTO: 29.8 PG (ref 25.2–33.5)
MCHC RBC AUTO-ENTMCNC: 33.1 G/DL (ref 28.4–34.8)
MCV RBC AUTO: 90.1 FL (ref 82.6–102.9)
NRBC BLD-RTO: 0 PER 100 WBC
PLATELET # BLD AUTO: 384 K/UL (ref 138–453)
PMV BLD AUTO: 9.6 FL (ref 8.1–13.5)
POTASSIUM SERPL-SCNC: 3.8 MMOL/L (ref 3.7–5.3)
PROT SERPL-MCNC: 7.1 G/DL (ref 6.4–8.3)
RBC # BLD AUTO: 4.76 M/UL (ref 3.95–5.11)
SODIUM SERPL-SCNC: 139 MMOL/L (ref 135–144)
TRIGL SERPL-MCNC: 136 MG/DL
TSH SERPL DL<=0.05 MIU/L-ACNC: 2.95 UIU/ML (ref 0.3–5)
VLDLC SERPL CALC-MCNC: 27 MG/DL
WBC OTHER # BLD: 7.3 K/UL (ref 3.5–11.3)

## 2024-06-27 PROCEDURE — 85027 COMPLETE CBC AUTOMATED: CPT

## 2024-06-27 PROCEDURE — 84443 ASSAY THYROID STIM HORMONE: CPT

## 2024-06-27 PROCEDURE — 80053 COMPREHEN METABOLIC PANEL: CPT

## 2024-06-27 PROCEDURE — 36415 COLL VENOUS BLD VENIPUNCTURE: CPT

## 2024-06-27 PROCEDURE — 80061 LIPID PANEL: CPT

## 2024-09-12 ENCOUNTER — HOSPITAL ENCOUNTER (OUTPATIENT)
Age: 54
Setting detail: SPECIMEN
Discharge: HOME | End: 2024-09-12
Payer: COMMERCIAL

## 2024-09-12 ENCOUNTER — OFFICE VISIT (OUTPATIENT)
Dept: OBGYN | Age: 54
End: 2024-09-12
Payer: COMMERCIAL

## 2024-09-12 VITALS
WEIGHT: 136 LBS | BODY MASS INDEX: 24.1 KG/M2 | SYSTOLIC BLOOD PRESSURE: 120 MMHG | DIASTOLIC BLOOD PRESSURE: 64 MMHG | HEIGHT: 63 IN

## 2024-09-12 DIAGNOSIS — Z01.419 WOMEN'S ANNUAL ROUTINE GYNECOLOGICAL EXAMINATION: Primary | ICD-10-CM

## 2024-09-12 DIAGNOSIS — Z01.419 WOMEN'S ANNUAL ROUTINE GYNECOLOGICAL EXAMINATION: ICD-10-CM

## 2024-09-12 PROBLEM — J45.20 MILD INTERMITTENT ASTHMA: Status: ACTIVE | Noted: 2022-01-11

## 2024-09-12 PROBLEM — T78.40XA ALLERGIES: Status: ACTIVE | Noted: 2023-06-05

## 2024-09-12 PROCEDURE — 87591 N.GONORRHOEAE DNA AMP PROB: CPT

## 2024-09-12 PROCEDURE — 87491 CHLMYD TRACH DNA AMP PROBE: CPT

## 2024-09-12 PROCEDURE — 99396 PREV VISIT EST AGE 40-64: CPT | Performed by: OBSTETRICS & GYNECOLOGY

## 2024-09-12 PROCEDURE — G0145 SCR C/V CYTO,THINLAYER,RESCR: HCPCS

## 2024-09-12 RX ORDER — AMOXICILLIN 500 MG/1
TABLET, FILM COATED ORAL
COMMUNITY
Start: 2023-04-12 | End: 2024-09-12

## 2024-09-12 RX ORDER — DEXTROMETHORPHAN HYDROBROMIDE, GUAIFENESIN AND PSEUDOEPHEDRINE HYDROCHLORIDE 15; 400; 60 MG/1; MG/1; MG/1
1 TABLET ORAL 3 TIMES DAILY PRN
COMMUNITY
Start: 2024-08-30

## 2024-09-12 RX ORDER — PREDNISONE 50 MG/1
50 TABLET ORAL DAILY
COMMUNITY
Start: 2024-08-29

## 2024-09-12 RX ORDER — HYDROXYZINE HYDROCHLORIDE 10 MG/1
TABLET, FILM COATED ORAL
COMMUNITY
Start: 2022-03-28

## 2024-09-12 RX ORDER — MUPIROCIN CALCIUM 20 MG/G
CREAM TOPICAL
COMMUNITY
Start: 2018-11-15

## 2024-09-12 RX ORDER — FLUCONAZOLE 100 MG/1
TABLET ORAL
COMMUNITY
Start: 2024-08-29 | End: 2024-09-12

## 2024-09-12 RX ORDER — CEFDINIR 300 MG/1
300 CAPSULE ORAL 2 TIMES DAILY
COMMUNITY
Start: 2024-05-18

## 2024-09-13 LAB
C TRACH DNA SPEC QL PROBE+SIG AMP: NEGATIVE
N GONORRHOEA DNA SPEC QL PROBE+SIG AMP: NEGATIVE
SPECIMEN DESCRIPTION: NORMAL

## 2024-09-23 LAB — CYTOLOGY REPORT: NORMAL

## 2025-03-27 ENCOUNTER — HOSPITAL ENCOUNTER (OUTPATIENT)
Dept: WOMENS IMAGING | Age: 55
Discharge: HOME | End: 2025-03-29
Payer: COMMERCIAL

## 2025-03-27 VITALS — BODY MASS INDEX: 23.04 KG/M2 | HEIGHT: 63 IN | WEIGHT: 130 LBS

## 2025-03-27 DIAGNOSIS — Z12.39 SCREENING BREAST EXAMINATION: ICD-10-CM

## 2025-03-27 PROCEDURE — 77063 BREAST TOMOSYNTHESIS BI: CPT

## 2025-04-22 ENCOUNTER — HOSPITAL ENCOUNTER (OUTPATIENT)
Age: 55
Discharge: HOME | End: 2025-04-22
Payer: COMMERCIAL

## 2025-04-22 LAB
ALBUMIN SERPL-MCNC: 4.4 G/DL (ref 3.5–5.2)
ALBUMIN/GLOB SERPL: 1.6 {RATIO} (ref 1–2.5)
ALP SERPL-CCNC: 98 U/L (ref 35–104)
ALT SERPL-CCNC: 12 U/L (ref 10–35)
ANION GAP SERPL CALCULATED.3IONS-SCNC: 10 MMOL/L (ref 9–16)
AST SERPL-CCNC: 15 U/L (ref 10–35)
BASOPHILS # BLD: 0.04 K/UL (ref 0–0.2)
BASOPHILS NFR BLD: 1 % (ref 0–2)
BILIRUB SERPL-MCNC: 0.3 MG/DL (ref 0–1.2)
BUN SERPL-MCNC: 14 MG/DL (ref 6–20)
BUN/CREAT SERPL: 23 (ref 9–20)
CALCIUM SERPL-MCNC: 9.7 MG/DL (ref 8.6–10.4)
CHLORIDE SERPL-SCNC: 101 MMOL/L (ref 98–107)
CHOLEST SERPL-MCNC: 258 MG/DL (ref 0–199)
CHOLESTEROL/HDL RATIO: 4.5
CO2 SERPL-SCNC: 30 MMOL/L (ref 20–31)
CREAT SERPL-MCNC: 0.6 MG/DL (ref 0.5–0.9)
EOSINOPHIL # BLD: 0.19 K/UL (ref 0–0.44)
EOSINOPHILS RELATIVE PERCENT: 4 % (ref 1–4)
ERYTHROCYTE [DISTWIDTH] IN BLOOD BY AUTOMATED COUNT: 12.9 % (ref 11.8–14.4)
GFR, ESTIMATED: >90 ML/MIN/1.73M2
GLUCOSE SERPL-MCNC: 92 MG/DL (ref 74–99)
HCT VFR BLD AUTO: 42.6 % (ref 36.3–47.1)
HDLC SERPL-MCNC: 57 MG/DL
HGB BLD-MCNC: 14 G/DL (ref 11.9–15.1)
IMM GRANULOCYTES # BLD AUTO: <0.03 K/UL (ref 0–0.3)
IMM GRANULOCYTES NFR BLD: 0 %
LDLC SERPL CALC-MCNC: 177 MG/DL (ref 0–100)
LYMPHOCYTES NFR BLD: 1.97 K/UL (ref 1.1–3.7)
LYMPHOCYTES RELATIVE PERCENT: 36 % (ref 24–43)
MCH RBC QN AUTO: 29.2 PG (ref 25.2–33.5)
MCHC RBC AUTO-ENTMCNC: 32.9 G/DL (ref 28.4–34.8)
MCV RBC AUTO: 88.8 FL (ref 82.6–102.9)
MONOCYTES NFR BLD: 0.46 K/UL (ref 0.1–1.2)
MONOCYTES NFR BLD: 8 % (ref 3–12)
NEUTROPHILS NFR BLD: 51 % (ref 36–65)
NEUTS SEG NFR BLD: 2.83 K/UL (ref 1.5–8.1)
NRBC BLD-RTO: 0 PER 100 WBC
PLATELET # BLD AUTO: 313 K/UL (ref 138–453)
PMV BLD AUTO: 9.9 FL (ref 8.1–13.5)
POTASSIUM SERPL-SCNC: 3.8 MMOL/L (ref 3.7–5.3)
PROT SERPL-MCNC: 7.2 G/DL (ref 6.6–8.7)
RBC # BLD AUTO: 4.8 M/UL (ref 3.95–5.11)
SODIUM SERPL-SCNC: 141 MMOL/L (ref 136–145)
T4 FREE SERPL-MCNC: 1.2 NG/DL (ref 0.9–1.7)
TRIGL SERPL-MCNC: 121 MG/DL
TSH SERPL DL<=0.05 MIU/L-ACNC: 4.68 UIU/ML (ref 0.27–4.2)
VLDLC SERPL CALC-MCNC: 24 MG/DL (ref 1–30)
WBC OTHER # BLD: 5.5 K/UL (ref 3.5–11.3)

## 2025-04-22 PROCEDURE — 84443 ASSAY THYROID STIM HORMONE: CPT

## 2025-04-22 PROCEDURE — 80061 LIPID PANEL: CPT

## 2025-04-22 PROCEDURE — 85025 COMPLETE CBC W/AUTO DIFF WBC: CPT

## 2025-04-22 PROCEDURE — 80053 COMPREHEN METABOLIC PANEL: CPT

## 2025-04-22 PROCEDURE — 84439 ASSAY OF FREE THYROXINE: CPT

## 2025-04-22 PROCEDURE — 36415 COLL VENOUS BLD VENIPUNCTURE: CPT

## 2025-05-13 ENCOUNTER — OFFICE VISIT (OUTPATIENT)
Dept: GASTROENTEROLOGY | Age: 55
End: 2025-05-13

## 2025-05-13 ENCOUNTER — HOSPITAL ENCOUNTER (OUTPATIENT)
Dept: NON INVASIVE DIAGNOSTICS | Age: 55
Discharge: HOME | End: 2025-05-13

## 2025-05-13 VITALS
WEIGHT: 129 LBS | SYSTOLIC BLOOD PRESSURE: 110 MMHG | OXYGEN SATURATION: 98 % | HEART RATE: 75 BPM | RESPIRATION RATE: 18 BRPM | DIASTOLIC BLOOD PRESSURE: 68 MMHG | BODY MASS INDEX: 22.85 KG/M2

## 2025-05-13 DIAGNOSIS — Z01.818 PRE-OP TESTING: ICD-10-CM

## 2025-05-13 DIAGNOSIS — R19.5 POSITIVE COLORECTAL CANCER SCREENING USING COLOGUARD TEST: ICD-10-CM

## 2025-05-13 DIAGNOSIS — K62.5 RECTAL BLEEDING: Primary | ICD-10-CM

## 2025-05-13 DIAGNOSIS — Z12.11 SCREENING FOR COLON CANCER: ICD-10-CM

## 2025-05-13 DIAGNOSIS — Z12.11 SCREEN FOR COLON CANCER: Primary | ICD-10-CM

## 2025-05-13 RX ORDER — OMEPRAZOLE 40 MG/1
CAPSULE, DELAYED RELEASE ORAL
COMMUNITY
Start: 2025-04-12

## 2025-05-13 ASSESSMENT — ENCOUNTER SYMPTOMS: RESPIRATORY NEGATIVE: 1

## 2025-05-13 NOTE — PROGRESS NOTES
[address] [address]    Chief Complaint   Patient presents with    Colonoscopy     Patient has + cologuard 6/25/23         HPI Mildred Bee is a 54 y.o. old female who has a past medical history of environmental allergies, IBS, presents as a new referral with history of a positive Cologuard 6/25/2023 and rectal bleeding.      Family history of colon cancer: No  Blood in stool: Yes - rectal bleeding last noticed \"several years ago\"  Unintentional weight loss: No  Abdominal pain: Yes (cramping related to her IBS)  Prior colonoscopy: Yes 15-20 years ago in [place]  Prior EGD: Yes  Constipation history: Yes  Number of bowel movements a day: every 2-3 days  Change in stool caliber: Yes       Contains abnormal data FECAL DNA COLORECTAL CANCER SCREENING (COLOGUARD)  Order: [id number]   Status: Edited Result - FINAL       Next appt: None    Specimen Information: Stool   Specimen Comment: Specimen type and source: Stool, Rectum structure (body structure)       Component  Ref Range & Units (hover)    FIT-DNA (Cologuard) Positive Abnormal    Comment:  POSITIVE TEST RESULT. A positive Cologuard result should be followed with a colonoscopy or visual examination of the colon. The normal value (reference range) for this assay is negative.             Past Surgical History:   Procedure Laterality Date    COLONOSCOPY      SINUS SURGERY      UPPER GASTROINTESTINAL ENDOSCOPY           Current Outpatient Medications   Medication Sig Dispense Refill    omeprazole (PRILOSEC) 40 MG delayed release capsule Take by mouth      hydrOXYzine HCl (ATARAX) 10 MG tablet Take by mouth      CAPMIST DM 60- MG TABS Take 1 tablet by mouth 3 times daily as needed      mupirocin (BACTROBAN) 2 % cream Apply three times a day to area of concern for 10 days      acyclovir (ZOVIRAX) 800 MG tablet take 1 tablet by mouth three times a day for 2 days for BREAK OUT      loratadine (CLARITIN) 10 MG tablet Take 1

## 2025-05-13 NOTE — PATIENT INSTRUCTIONS
SURVEY:    You may be receiving a survey from [facility] [employer] [facility] regarding your visit today.    You may get this in the mail, through your MyChart, or in your email.     Please complete the survey to enable us to provide the highest quality of care to you and your family.    If you cannot score us a very good (5 Stars) on any question, please call the office to discuss how we could of made your experience exceptional.    Thank you!    General Surgery    MD Dr. Radha Zaragoza, DO  Dr. Jin John, DO  Cathy Shields, RUSS-CNP    Pain Mgmt.  Dr. Lux Oliva, DO  JACKSON Vines, JORDANA Alvarez LPN Jena Adams, MA Emily Akers, MA    Phone: [phone]  Fax: [phone]    Office Hours:   M-TH 8-5, F: 8-12

## 2025-05-14 LAB
EKG ATRIAL RATE: 77 BPM
EKG P AXIS: 45 DEGREES
EKG P-R INTERVAL: 162 MS
EKG Q-T INTERVAL: 404 MS
EKG QRS DURATION: 100 MS
EKG QTC CALCULATION (BAZETT): 457 MS
EKG R AXIS: 8 DEGREES
EKG T AXIS: 37 DEGREES
EKG VENTRICULAR RATE: 77 BPM

## 2025-05-14 RX ORDER — CETIRIZINE HYDROCHLORIDE, PSEUDOEPHEDRINE HYDROCHLORIDE 5; 120 MG/1; MG/1
1 TABLET, FILM COATED, EXTENDED RELEASE ORAL DAILY
COMMUNITY

## 2025-05-14 NOTE — PROGRESS NOTES
Patient states that received their colon prep instructions and home medications that are to be taken on the day of their procedure with a small sip of water only (omeprazole) from the physician's office. NPO status (including no gum, hard candy, mints, water, coffee, or smoking) was reviewed and patient verbalizes understanding. Patient denies any fever related illnesses or antibiotic use for any upper respiratory conditions in the last 4 weeks. They were advised to contact their surgeon's office if they experience any fever, chills, or cold symptoms before their procedure.

## 2025-05-16 ENCOUNTER — ANESTHESIA EVENT (OUTPATIENT)
Dept: OPERATING ROOM | Age: 55
End: 2025-05-16
Payer: COMMERCIAL

## 2025-05-19 ENCOUNTER — HOSPITAL ENCOUNTER (OUTPATIENT)
Age: 55
Setting detail: OUTPATIENT SURGERY
Discharge: HOME | End: 2025-05-19
Attending: STUDENT IN AN ORGANIZED HEALTH CARE EDUCATION/TRAINING PROGRAM | Admitting: STUDENT IN AN ORGANIZED HEALTH CARE EDUCATION/TRAINING PROGRAM
Payer: COMMERCIAL

## 2025-05-19 ENCOUNTER — ANESTHESIA (OUTPATIENT)
Dept: OPERATING ROOM | Age: 55
End: 2025-05-19
Payer: COMMERCIAL

## 2025-05-19 VITALS
BODY MASS INDEX: 20.73 KG/M2 | DIASTOLIC BLOOD PRESSURE: 58 MMHG | OXYGEN SATURATION: 100 % | SYSTOLIC BLOOD PRESSURE: 140 MMHG | WEIGHT: 117 LBS | HEART RATE: 67 BPM | RESPIRATION RATE: 16 BRPM | TEMPERATURE: 96.9 F | HEIGHT: 63 IN

## 2025-05-19 DIAGNOSIS — Z12.11 SCREENING FOR COLON CANCER: ICD-10-CM

## 2025-05-19 PROBLEM — R19.5 POSITIVE FIT (FECAL IMMUNOCHEMICAL TEST): Status: ACTIVE | Noted: 2025-05-19

## 2025-05-19 PROCEDURE — 7100000011 HC PHASE II RECOVERY - ADDTL 15 MIN: Performed by: STUDENT IN AN ORGANIZED HEALTH CARE EDUCATION/TRAINING PROGRAM

## 2025-05-19 PROCEDURE — 7100000010 HC PHASE II RECOVERY - FIRST 15 MIN: Performed by: STUDENT IN AN ORGANIZED HEALTH CARE EDUCATION/TRAINING PROGRAM

## 2025-05-19 PROCEDURE — 88305 TISSUE EXAM BY PATHOLOGIST: CPT

## 2025-05-19 PROCEDURE — 2709999900 HC NON-CHARGEABLE SUPPLY: Performed by: STUDENT IN AN ORGANIZED HEALTH CARE EDUCATION/TRAINING PROGRAM

## 2025-05-19 PROCEDURE — 6360000002 HC RX W HCPCS: Performed by: NURSE ANESTHETIST, CERTIFIED REGISTERED

## 2025-05-19 PROCEDURE — 3700000001 HC ADD 15 MINUTES (ANESTHESIA): Performed by: STUDENT IN AN ORGANIZED HEALTH CARE EDUCATION/TRAINING PROGRAM

## 2025-05-19 PROCEDURE — 2580000003 HC RX 258

## 2025-05-19 PROCEDURE — 3700000000 HC ANESTHESIA ATTENDED CARE: Performed by: STUDENT IN AN ORGANIZED HEALTH CARE EDUCATION/TRAINING PROGRAM

## 2025-05-19 PROCEDURE — 3609010600 HC COLONOSCOPY POLYPECTOMY SNARE/COLD BIOPSY: Performed by: STUDENT IN AN ORGANIZED HEALTH CARE EDUCATION/TRAINING PROGRAM

## 2025-05-19 PROCEDURE — 45380 COLONOSCOPY AND BIOPSY: CPT | Performed by: STUDENT IN AN ORGANIZED HEALTH CARE EDUCATION/TRAINING PROGRAM

## 2025-05-19 RX ORDER — SODIUM CHLORIDE, SODIUM LACTATE, POTASSIUM CHLORIDE, CALCIUM CHLORIDE 600; 310; 30; 20 MG/100ML; MG/100ML; MG/100ML; MG/100ML
INJECTION, SOLUTION INTRAVENOUS CONTINUOUS
Status: DISCONTINUED | OUTPATIENT
Start: 2025-05-19 | End: 2025-05-19 | Stop reason: HOSPADM

## 2025-05-19 RX ORDER — SODIUM CHLORIDE 9 MG/ML
INJECTION, SOLUTION INTRAVENOUS PRN
Status: DISCONTINUED | OUTPATIENT
Start: 2025-05-19 | End: 2025-05-19 | Stop reason: HOSPADM

## 2025-05-19 RX ORDER — SODIUM CHLORIDE 0.9 % (FLUSH) 0.9 %
5-40 SYRINGE (ML) INJECTION EVERY 12 HOURS SCHEDULED
Status: DISCONTINUED | OUTPATIENT
Start: 2025-05-19 | End: 2025-05-19 | Stop reason: HOSPADM

## 2025-05-19 RX ORDER — LIDOCAINE HYDROCHLORIDE 20 MG/ML
INJECTION, SOLUTION EPIDURAL; INFILTRATION; INTRACAUDAL; PERINEURAL
Status: DISCONTINUED | OUTPATIENT
Start: 2025-05-19 | End: 2025-05-19 | Stop reason: SDUPTHER

## 2025-05-19 RX ORDER — SODIUM CHLORIDE 0.9 % (FLUSH) 0.9 %
5-40 SYRINGE (ML) INJECTION PRN
Status: DISCONTINUED | OUTPATIENT
Start: 2025-05-19 | End: 2025-05-19 | Stop reason: HOSPADM

## 2025-05-19 RX ORDER — NALOXONE HYDROCHLORIDE 0.4 MG/ML
INJECTION, SOLUTION INTRAMUSCULAR; INTRAVENOUS; SUBCUTANEOUS PRN
Status: DISCONTINUED | OUTPATIENT
Start: 2025-05-19 | End: 2025-05-19 | Stop reason: HOSPADM

## 2025-05-19 RX ORDER — PROPOFOL 10 MG/ML
INJECTION, EMULSION INTRAVENOUS
Status: DISCONTINUED | OUTPATIENT
Start: 2025-05-19 | End: 2025-05-19 | Stop reason: SDUPTHER

## 2025-05-19 RX ADMIN — PROPOFOL 180 MCG/KG/MIN: 10 INJECTION, EMULSION INTRAVENOUS at 14:50

## 2025-05-19 RX ADMIN — SODIUM CHLORIDE, SODIUM LACTATE, POTASSIUM CHLORIDE, AND CALCIUM CHLORIDE: .6; .31; .03; .02 INJECTION, SOLUTION INTRAVENOUS at 14:05

## 2025-05-19 RX ADMIN — PHENYLEPHRINE HYDROCHLORIDE 50 MCG: 10 INJECTION INTRAVENOUS at 14:59

## 2025-05-19 RX ADMIN — LIDOCAINE HYDROCHLORIDE 100 MG: 20 INJECTION, SOLUTION EPIDURAL; INFILTRATION; INTRACAUDAL; PERINEURAL at 14:49

## 2025-05-19 RX ADMIN — PROPOFOL 60 MG: 10 INJECTION, EMULSION INTRAVENOUS at 14:49

## 2025-05-19 RX ADMIN — PHENYLEPHRINE HYDROCHLORIDE 50 MCG: 10 INJECTION INTRAVENOUS at 14:54

## 2025-05-19 ASSESSMENT — PAIN - FUNCTIONAL ASSESSMENT
PAIN_FUNCTIONAL_ASSESSMENT: FACE, LEGS, ACTIVITY, CRY, AND CONSOLABILITY (FLACC)
PAIN_FUNCTIONAL_ASSESSMENT: FACE, LEGS, ACTIVITY, CRY, AND CONSOLABILITY (FLACC)
PAIN_FUNCTIONAL_ASSESSMENT: 0-10

## 2025-05-19 ASSESSMENT — PAIN DESCRIPTION - DESCRIPTORS
DESCRIPTORS: ACHING
DESCRIPTORS: CRAMPING

## 2025-05-19 ASSESSMENT — LIFESTYLE VARIABLES: SMOKING_STATUS: 0

## 2025-05-19 NOTE — PROGRESS NOTES
Pt states her lower abdomen is quite crampy, encouraged passing gas and positioning in whatever way feels more comfortable, pt rolls onto left side and passes gas, states it feels better, not yet ready to drink anything, still quite uncomfortable.

## 2025-05-19 NOTE — DISCHARGE INSTRUCTIONS
Discharge Instructions:    Diet:   You may resume a regular diet.    Activity:   Light activity for remainder of the day.    Reasons to Return:   Some abdominal soreness common, especially for the first 24-48 hours. If, however, you experience worsening pain, fever above 101.5 degrees Farenheit, bleeding that does not stop soon after discovery, or any other concerns, please either call the office or call/return to the Emergency Department for further evaluation.    Follow up with Dr. John or call office in 1 week      SAME DAY SURGERY DISCHARGE INSTRUCTIONS    1.  Do not drive or operate hazardous machinery for 24 hours.    2.  Do not make important personal or business decisions for 24 hours.    3.  Do not drink alcoholic beverages for 24 hours.    4.  Do not smoke tobacco products for 24 hours.    5.  Eat light foods (Jell-O, soups, etc....) and drink plenty of fluids (water, Sprite, etc...) up to 8 glasses per day, as you can tolerate.    6.  Limit your activities for 24 hours.  Do not engage in heavy work until your surgeon gives you permission.      7.  Call your surgeon for any questions regarding your surgery.    8.  Patient should not be left alone for 12-24 hours following surgical procedure.    COLONOSCOPY DISCHARGE INSTRUCTIONS:    It's normal to have a feeling of fullness or mild cramping in your abdomen afterwards due to air that is put into your bowel during the procedure.  Mild activities such as walking will help you pass the air.    You may resume your regular diet.    You will receive a letter or phone call with your test results in 2 weeks.  If you have not received a letter or a phone call in 2 weeks please call the office for your results.    CALL THE DOCTOR IF YOU HAVE:     Chest pain or trouble breathing.    Bleeding from your rectum, vomiting or spitting up of blood that is more than a few streaks or red or black stools    A fever above 101F or if you have chills    Pain that is worse or

## 2025-05-19 NOTE — ANESTHESIA POSTPROCEDURE EVALUATION
Department of Anesthesiology  Postprocedure Note    Patient: Mildred Bee  MRN: [id number]  YOB: 1970  Date of evaluation: 5/19/2025    Procedure Summary       Date: 05/19/25 Room / Location: [address]    Anesthesia Start: 1446 Anesthesia Stop: 1527    Procedure: COLONOSCOPY POLYPECTOMY SNARE/BIOPSY (Abdomen) Diagnosis:       Screening for colon cancer      (Screening for colon cancer [Z12.11])    Surgeons: Jin John DO Responsible Provider: Jhonathan Gomez APRN - CRNA    Anesthesia Type: general, TIVA ASA Status: 2            Anesthesia Type: No value filed.    Chelsey Phase I: Chelsey Score: 10    Chelsey Phase II: Chelsey Score: 10    Anesthesia Post Evaluation    Patient location during evaluation: bedside  Patient participation: complete - patient participated  Level of consciousness: awake and alert  Pain score: 3  Airway patency: patent  Nausea & Vomiting: no nausea and no vomiting  Cardiovascular status: hemodynamically stable  Respiratory status: acceptable  Hydration status: stable  Pain management: adequate        No notable events documented.

## 2025-05-19 NOTE — ANESTHESIA PRE PROCEDURE
Department of Anesthesiology  Preprocedure Note       Name:  Mildred Bee   Age:  54 y.o.  :  1970                                          MRN:  [id number]         Date:  2025      Surgeon: Surgeon(s):  Jin John DO    Procedure: Procedure(s):  COLORECTAL CANCER SCREENING, NOT HIGH RISK    Medications prior to admission:   Prior to Admission medications    Medication Sig Start Date End Date Taking? Authorizing Provider   cetirizine-psuedoephedrine (ZYRTEC-D) 5-120 MG per extended release tablet Take 1 tablet by mouth daily   Yes Provider, MD Hilton   omeprazole (PRILOSEC) 40 MG delayed release capsule Take by mouth 25  Yes ProviderHilton MD   hydrOXYzine HCl (ATARAX) 10 MG tablet Take by mouth 3/28/22  Yes ProviderHilton MD   predniSONE (DELTASONE) 50 MG tablet Take 1 tablet by mouth daily 24  Yes ProviderHilton MD   CAPMIST DM 60- MG TABS Take 1 tablet by mouth 3 times daily as needed 24  Yes Provider, MD Hilton   mupirocin (BACTROBAN) 2 % cream Apply three times a day to area of concern for 10 days 11/15/18  Yes Provider, MD Hilton   acyclovir (ZOVIRAX) 800 MG tablet take 1 tablet by mouth three times a day for 2 days for BREAK OUT 23  Yes Provider, MD Hilton   calcium carbonate (OS-BHAVESH) 1250 (500 Ca) MG chewable tablet Take 500 mg by mouth daily   Yes ProviderHilton MD   loratadine (CLARITIN) 10 MG tablet Take 1 tablet by mouth daily   Yes Provider, MD Hilton   Chelated Zinc 50 MG TABS Take 50 mg by mouth daily   Yes Provider, MD Hilton   spironolactone (ALDACTONE) 50 MG tablet take 1 tablet by mouth once daily 22  Yes Hilton Goyal MD   SODIUM FLUORIDE 5000 PPM 1.1 % PSTE BRUSH WITH PEA SIZED AMOUNT at bedtime for 2 MINUTES DO NOT EAT OR DRINK FOR 30 MINUTES 22  Yes Hilton Goyal MD   dicyclomine (BENTYL) 10 MG capsule take 1 capsule by mouth four times a day if needed for

## 2025-05-19 NOTE — H&P
[facility]    DATE: May 19, 2025     SUBJECTIVE:  MARGO CLAUDIO is a 54 y.o. female who presents for colonoscopy due to positive fit test.  Has a history of IBS with intermittent diarrhea and constipation.  Has noticed blood in stools in the past and typically just on the toilet paper.    Family history of colon cancer: No  Blood in stool: Yes - rectal bleeding last noticed \"several years ago\"  Unintentional weight loss: No  Abdominal pain: Yes (cramping related to her IBS)  Prior colonoscopy: Yes 15-20 years ago in [place]  Prior EGD: Yes  Constipation history: Yes  Number of bowel movements a day: every 2-3 days  Change in stool caliber: Yes       Past Medical History:   Diagnosis Date    Environmental allergies     IBS (irritable bowel syndrome)     Sinusitis        Past Surgical History:   Procedure Laterality Date    COLONOSCOPY      more than 10 years ago (as of 2025)    SINUS SURGERY      UPPER GASTROINTESTINAL ENDOSCOPY         Current Facility-Administered Medications   Medication Dose Route Frequency Provider Last Rate Last Admin    lactated ringers infusion   IntraVENous Continuous Shaunna Oshea APRN - CRNA 100 mL/hr at 05/19/25 1405 New Bag at 05/19/25 1405    famotidine (PEPCID) 20 MG/2ML 20 mg in sodium chloride (PF) 0.9 % 10 mL injection  20 mg IntraVENous Once Shaunna Oshea APRN - CRNA           Allergies   Allergen Reactions    Dust Mite Extract     Gramineae Pollens      Other reaction(s): Unknown    Grass Pollen(K-O-R-T-Swt Betito)     Molds & Smuts     Other/Food      Pt unsure what this would be       Family History   Problem Relation Age of Onset    Breast Cancer Maternal Aunt        Social History     Socioeconomic History    Marital status: Single     Spouse name: Not on file    Number of children: Not on file    Years of education: Not on file    Highest education level: Not on file   Occupational History    Not on file   Tobacco Use    Smoking status: Never

## 2025-05-19 NOTE — PROGRESS NOTES
Patient verbalizes readiness for discharge. Discharge instructions given to patient and responsible adult, answered all questions, and verbalized understanding of discharge instructions.     Discharge Criteria    Inpatients must meet Criteria 1 through 7. All other patients are either YES or N/A. If a NO is chosen then Anesthesia or Surgeon must be notified.      1.  Minimum 30 minutes after last dose of sedative medication.    Yes      2.  Systolic BP between 90 - 160. Diastolic BP between 60 - 90.    Yes, diastolic slightly low but near baseline, asymptomatic, ok to discharge per JOB Powell.      3.  Pulse between 60 - 120    Yes      4.  Respirations between 8 - 25.    Yes      5.  SpO2 92% - 100%.    Yes      6.  Able to cough and swallow or return to baseline function.    Yes      7.  Alert and oriented or return to baseline mental status.    Yes      8.  Demonstrates controlled, coordinated movements, ambulates with steady gait, or return to baseline activity function.    Yes      9.  Minimal or no pain or nausea, or at a level tolerable and acceptable to patient.    Yes      10. Takes and retains oral fluids as allowed.    Yes      11. Procedural / perioperative site stable.  Minimal or no bleeding.    Yes          12. If GI endoscopy procedure, minimal or no abdominal distention or passing flatus.    Yes      13. Written discharge instructions and emergency telephone number provided.    Yes      14. Accompanied by a responsible adult.    Yes

## 2025-05-21 LAB — SURGICAL PATHOLOGY REPORT: NORMAL

## 2025-06-04 ENCOUNTER — TELEPHONE (OUTPATIENT)
Dept: SURGERY | Age: 55
End: 2025-06-04

## (undated) DEVICE — TUBING SUCT NON-STRL 9/32X100 W/CNNT

## (undated) DEVICE — SOLUTION IRRIG 1000ML 0.9% SOD CHL USP POUR PLAS BTL

## (undated) DEVICE — FORCEPS BX L240CM JAW DIA2.8MM L CAP W/ NDL MIC MESH TOOTH

## (undated) DEVICE — ENDOSCOPIC KIT 1.1+ 10 FT OP4 CA DE 2 GWN AAMI LEVEL 3

## (undated) DEVICE — CANNULA ORAL NSL AD CO2 N INTUB O2 DEL DISP TRU LNK

## (undated) DEVICE — GOWN ,SIRUS ,NONREINFORCED 4XL: Brand: MEDLINE